# Patient Record
Sex: MALE | Race: WHITE | NOT HISPANIC OR LATINO | Employment: FULL TIME | URBAN - METROPOLITAN AREA
[De-identification: names, ages, dates, MRNs, and addresses within clinical notes are randomized per-mention and may not be internally consistent; named-entity substitution may affect disease eponyms.]

---

## 2017-01-09 ENCOUNTER — HOSPITAL ENCOUNTER (OUTPATIENT)
Dept: LAB | Facility: MEDICAL CENTER | Age: 62
End: 2017-01-09
Attending: INTERNAL MEDICINE
Payer: COMMERCIAL

## 2017-01-09 DIAGNOSIS — E03.9 HYPOTHYROIDISM: ICD-10-CM

## 2017-01-09 DIAGNOSIS — E78.5 HYPERLIPIDEMIA: ICD-10-CM

## 2017-01-09 LAB
CHOLEST SERPL-MCNC: 142 MG/DL (ref 100–199)
HDLC SERPL-MCNC: 50 MG/DL
LDLC SERPL CALC-MCNC: 81 MG/DL
TRIGL SERPL-MCNC: 57 MG/DL (ref 0–149)
TSH SERPL DL<=0.005 MIU/L-ACNC: 3.04 UIU/ML (ref 0.3–3.7)

## 2017-01-09 PROCEDURE — 80061 LIPID PANEL: CPT

## 2017-01-09 PROCEDURE — 84443 ASSAY THYROID STIM HORMONE: CPT

## 2017-01-09 PROCEDURE — 36415 COLL VENOUS BLD VENIPUNCTURE: CPT

## 2017-01-13 ENCOUNTER — OFFICE VISIT (OUTPATIENT)
Dept: MEDICAL GROUP | Facility: MEDICAL CENTER | Age: 62
End: 2017-01-13
Payer: COMMERCIAL

## 2017-01-13 VITALS
HEIGHT: 74 IN | DIASTOLIC BLOOD PRESSURE: 78 MMHG | BODY MASS INDEX: 25.54 KG/M2 | SYSTOLIC BLOOD PRESSURE: 120 MMHG | WEIGHT: 199 LBS | TEMPERATURE: 98.1 F | RESPIRATION RATE: 16 BRPM | OXYGEN SATURATION: 97 % | HEART RATE: 60 BPM

## 2017-01-13 DIAGNOSIS — K59.09 CHRONIC CONSTIPATION: ICD-10-CM

## 2017-01-13 DIAGNOSIS — Z00.00 ROUTINE GENERAL MEDICAL EXAMINATION AT A HEALTH CARE FACILITY: ICD-10-CM

## 2017-01-13 DIAGNOSIS — E03.9 HYPOTHYROIDISM, UNSPECIFIED TYPE: ICD-10-CM

## 2017-01-13 DIAGNOSIS — Z23 FLU VACCINE NEED: ICD-10-CM

## 2017-01-13 DIAGNOSIS — Z23 NEED FOR TDAP VACCINATION: ICD-10-CM

## 2017-01-13 DIAGNOSIS — E78.5 HYPERLIPIDEMIA, UNSPECIFIED HYPERLIPIDEMIA TYPE: ICD-10-CM

## 2017-01-13 PROCEDURE — 99396 PREV VISIT EST AGE 40-64: CPT | Mod: 25 | Performed by: INTERNAL MEDICINE

## 2017-01-13 PROCEDURE — 90472 IMMUNIZATION ADMIN EACH ADD: CPT | Performed by: INTERNAL MEDICINE

## 2017-01-13 PROCEDURE — 90715 TDAP VACCINE 7 YRS/> IM: CPT | Performed by: INTERNAL MEDICINE

## 2017-01-13 PROCEDURE — 90686 IIV4 VACC NO PRSV 0.5 ML IM: CPT | Performed by: INTERNAL MEDICINE

## 2017-01-13 PROCEDURE — 90471 IMMUNIZATION ADMIN: CPT | Performed by: INTERNAL MEDICINE

## 2017-01-13 RX ORDER — TRIAMCINOLONE ACETONIDE 1 MG/G
CREAM TOPICAL 2 TIMES DAILY
COMMUNITY
End: 2018-04-27

## 2017-01-13 NOTE — MR AVS SNAPSHOT
"        Steve Burnham   2017 2:30 PM   Office Visit   MRN: [id number]    Department:  [address] [place] [address]   Dept Phone:  [phone]    Description:  Male : 1955   Provider:  Allan Jones M.D.           Reason for Visit     Annual Exam           Allergies as of 2017     No Known Allergies      You were diagnosed with     Hypothyroidism, unspecified type   [0886376]       Hyperlipidemia, unspecified hyperlipidemia type   [3883221]       Flu vaccine need   [524216]       Need for Tdap vaccination   [239354]         Vital Signs     Blood Pressure Pulse Temperature Respirations Height Weight    120/78 mmHg 60 36.7 °C (98.1 °F) 16 1.88 m (6' 2.02\") 90.266 kg (199 lb)    Body Mass Index Oxygen Saturation Smoking Status             25.54 kg/m2 97% Never Smoker          Basic Information     Date Of Birth Sex Race Ethnicity Preferred Language    1955 Male White, Unknown Unknown English      Problem List              ICD-10-CM Priority Class Noted - Resolved    Hand arthritis M12.9   2015 - Present    Hypothyroidism E03.9   2015 - Present    Routine general medical examination at a health care facility Z00.00   2016 - Present    Flu vaccine need Z23   2016 - Present    Hyperlipidemia E78.5   2016 - Present    Chronic constipation K59.09   2016 - Present    Need for shingles vaccine Z23   2016 - Present      Health Maintenance        Date Due Completion Dates    IMM DTaP/Tdap/Td Vaccine (1 - Tdap) 1975 ---    IMM INFLUENZA (1) 2016    COLONOSCOPY 10/25/2019 10/25/2016, 2013            Current Immunizations     Influenza Vaccine Quad Inj (Pf)  Incomplete    Influenza Vaccine Quad Inj (Preserved) 2016    SHINGLES VACCINE 2016    Tdap Vaccine  Incomplete      Below and/or attached are the medications your provider expects you to take. Review all of your home medications and newly ordered medications with your provider and/or pharmacist. " Follow medication instructions as directed by your provider and/or pharmacist. Please keep your medication list with you and share with your provider. Update the information when medications are discontinued, doses are changed, or new medications (including over-the-counter products) are added; and carry medication information at all times in the event of emergency situations     Allergies:  No Known Allergies          Medications  Valid as of: January 13, 2017 -  3:23 PM    Generic Name Brand Name Tablet Size Instructions for use    Levothyroxine Sodium (Tab) SYNTHROID 25 MCG Take 1 Tab by mouth Every morning on an empty stomach.        Polyethylene Glycol 3350 (Powder) MIRALAX  Take 17 g by mouth every day.        Triamcinolone Acetonide (Cream) KENALOG 0.1 % Apply  to affected area(s) 2 times a day.        .                 Medicines prescribed today were sent to:     [id number] - [place], [place] - [address]    [address] [facility] [address]    Phone: [phone] Fax: [phone]    Open 24 Hours?: No      Medication refill instructions:       If your prescription bottle indicates you have medication refills left, it is not necessary to call your provider’s office. Please contact your pharmacy and they will refill your medication.    If your prescription bottle indicates you do not have any refills left, you may request refills at any time through one of the following ways: The online [employer] system (except Urgent Care), by calling your provider’s office, or by asking your pharmacy to contact your provider’s office with a refill request. Medication refills are processed only during regular business hours and may not be available until the next business day. Your provider may request additional information or to have a follow-up visit with you prior to refilling your medication.   *Please Note: Medication refills are assigned a new Rx number when refilled electronically. Your pharmacy may indicate that  no refills were authorized even though a new prescription for the same medication is available at the pharmacy. Please request the medicine by name with the pharmacy before contacting your provider for a refill.        Your To Do List     Future Labs/Procedures Complete By Expires    LIPID PROFILE  As directed 1/14/2018    TSH  As directed 1/14/2018         [employer] Access Code: Activation code not generated  Current [employer] Status: Active

## 2017-01-14 NOTE — PROGRESS NOTES
Chief Complaint:     Steve Burnhma is a 61 y.o. male who presents for a general exam and follow-up of his various health problems.    Need for Tdap vaccination  He has not had TDAP In the last 10 years and we'll get that done today.    Hypothyroidism  He continues levothyroxine. Clinically he is euthyroid. TSH is normal at 3.04.    Hyperlipidemia  He tries to follow appropriate diet. Cholesterol is 142, triglycerides 57, HDL 50, LDL 81.    Flu vaccine need  He has not had the flu vaccine this year and will get that done today.    Chronic constipation  His chronic constipation is not significantly changed. Last colonoscopy was October 2016. He uses MiraLAX with good result.      Last colonoscopy: 10/25/16  Last Td:  1/13/17  Hx STDs: no  Regular exercise: yes     He  has a past medical history of Hand arthritis (8/31/2015); Routine general medical examination at a health care facility (1/29/2016); and Chronic constipation (1/29/2016).  He  has no past surgical history on file.  Family History   Problem Relation Age of Onset   • Hypertension Father    • Thyroid Sister    • Hypertension Brother      Social History   Substance Use Topics   • Smoking status: Never Smoker    • Smokeless tobacco: Never Used   • Alcohol Use: 3.0 oz/week     5 Cans of beer per week       Current Outpatient Prescriptions   Medication Sig Dispense Refill   • triamcinolone acetonide (KENALOG) 0.1 % Cream Apply  to affected area(s) 2 times a day.     • levothyroxine (SYNTHROID) 25 MCG Tab Take 1 Tab by mouth Every morning on an empty stomach. 90 Tab 3   • polyethylene glycol 3350 (MIRALAX) Powder Take 17 g by mouth every day.       No current facility-administered medications for this visit.    (including changes today)  Allergies: Review of patient's allergies indicates no known allergies.    ROS:   General:  no recent change in strength, weight, appetite, or exercise tolerance.  CNS: no significant lightheadedness, headaches, fainting spells,  "seizures, or change in mentation.  EENT: no significant change in vision, hearing, sense of smell, swallowing, or voice.  RESP: no significant wheezing, coughing, or dyspnea.  CV: no significant palpitations or chest pain.  G.I.: no significant indigestion, abdominal pain, or change in bowel habits. Chronic constipation is unchanged.  : no significant change in urinating, no dysuria or hematuria, or change in sexual function, or change in testes.  EXTREM:  no significant edema, swelling, pain, or limitations of motion.  INTEG: no significant change in skin, hair or fingernails.  LYMPH: no significant adenopathy or other masses.  PSYCH: no significant anxiety or depression.        PHYSICAL EXAMINATION:  Body mass index is 25.54 kg/(m^2).  Wt Readings from Last 4 Encounters:   01/13/17 90.266 kg (199 lb)   09/09/16 92.625 kg (204 lb 3.2 oz)   01/29/16 93.895 kg (207 lb)   08/31/15 90.719 kg (200 lb)     PE:  /78 mmHg  Pulse 60  Temp(Src) 36.7 °C (98.1 °F)  Resp 16  Ht 1.88 m (6' 2.02\")  Wt 90.266 kg (199 lb)  BMI 25.54 kg/m2  SpO2 97%   GEN: clean, well groomed, in no acute distress, alert.  EENT: PERRL, normal EOMs, fundi unremarkable, normal external auditory canals and tympanic membranes, pharynx unremarkable, dentition satisfactory, nose unremarkable, neck supple and without significant lymphadenopathy or masses, trachea midline, thyroid unremarkable.  LUNGS: bilateral breast sounds, without significant wheezes or rales or abnormalities in percussion.  CV:  peripheral circulation is satisfactory, pedal pulses are satisfactory, heart sounds are unremarkable.  ABD: soft, without significant masses, no hepatosplenomegaly, no tenderness, bowel sounds are normal, no significant inguinal adenopathy.  : normal external genitalia, without urethral discharge, testes without significant masses, rectal exam unremarkable, prostate of normal contour and consistency,   EXTREM:  without significant edema, " cyanosis or deformity.  LYMPH:  no significant lymphadenopathy or lymphedema.  INTEG:  skin, hair, fingernails are unremarkable without significant rashes or lesions  NEURO:  essentially normal sensation, strength, gate, and cerebellar function, normal DTRs, Babinski is negative, affect is normal, oriented times three.  PSYCH: appropriate affect and mood.        ASSESSMENT/PLAN:  1. Hypothyroidism, unspecified type  TSH    Continue levothyroxine, check TSH at least yearly.   2. Hyperlipidemia, unspecified hyperlipidemia type  LIPID PROFILE    We again reviewed appropriate diet. Lipid panel is satisfactory.   3. Flu vaccine need  Flu Quad Inj >3 Year Pre-Filled PF    He will receive flu vaccine today.   4. Need for Tdap vaccination  Tdap =>6yo IM    He will receive TDAP Today.   5. Chronic constipation      Continue current regimen and follow-up with Dr. Lewis.   6. Routine general medical examination at a health care facility       Labs per orders  Counseling about diet, exercise, skin care  Vaccinations per orders  Next office visit for recheck of chronic medical conditions is due in  1 year

## 2017-01-14 NOTE — ASSESSMENT & PLAN NOTE
His chronic constipation is not significantly changed. Last colonoscopy was October 2016. He uses MiraLAX with good result.

## 2017-03-06 DIAGNOSIS — Z79.899 MEDICATION MANAGEMENT: ICD-10-CM

## 2017-03-06 RX ORDER — LEVOTHYROXINE SODIUM 0.03 MG/1
25 TABLET ORAL
Qty: 90 TAB | Refills: 3 | Status: SHIPPED | OUTPATIENT
Start: 2017-03-06 | End: 2018-03-02 | Stop reason: SDUPTHER

## 2017-03-07 DIAGNOSIS — Z79.899 MEDICATION MANAGEMENT: ICD-10-CM

## 2017-08-07 ENCOUNTER — TELEPHONE (OUTPATIENT)
Dept: MEDICAL GROUP | Facility: MEDICAL CENTER | Age: 62
End: 2017-08-07

## 2017-08-07 DIAGNOSIS — M54.42 ACUTE MIDLINE LOW BACK PAIN WITH LEFT-SIDED SCIATICA: ICD-10-CM

## 2017-08-07 NOTE — TELEPHONE ENCOUNTER
Patient needs new referral to [facility] for physical therapy, can you place the referral for patient

## 2017-09-19 RX ORDER — TRAMADOL HYDROCHLORIDE 50 MG/1
50 TABLET ORAL NIGHTLY PRN
Qty: 20 TAB | Refills: 0 | Status: SHIPPED | OUTPATIENT
Start: 2017-09-19 | End: 2018-04-27

## 2018-01-08 ENCOUNTER — HOSPITAL ENCOUNTER (OUTPATIENT)
Dept: LAB | Facility: MEDICAL CENTER | Age: 63
End: 2018-01-08
Attending: INTERNAL MEDICINE
Payer: COMMERCIAL

## 2018-01-08 DIAGNOSIS — E78.5 HYPERLIPIDEMIA, UNSPECIFIED HYPERLIPIDEMIA TYPE: ICD-10-CM

## 2018-01-08 DIAGNOSIS — E03.9 HYPOTHYROIDISM, UNSPECIFIED TYPE: ICD-10-CM

## 2018-01-08 LAB
CHOLEST SERPL-MCNC: 153 MG/DL (ref 100–199)
HDLC SERPL-MCNC: 56 MG/DL
LDLC SERPL CALC-MCNC: 87 MG/DL
TRIGL SERPL-MCNC: 52 MG/DL (ref 0–149)
TSH SERPL DL<=0.005 MIU/L-ACNC: 4.11 UIU/ML (ref 0.38–5.33)

## 2018-01-08 PROCEDURE — 80061 LIPID PANEL: CPT

## 2018-01-08 PROCEDURE — 36415 COLL VENOUS BLD VENIPUNCTURE: CPT

## 2018-01-08 PROCEDURE — 84443 ASSAY THYROID STIM HORMONE: CPT

## 2018-01-17 ENCOUNTER — OFFICE VISIT (OUTPATIENT)
Dept: MEDICAL GROUP | Facility: MEDICAL CENTER | Age: 63
End: 2018-01-17
Payer: COMMERCIAL

## 2018-01-17 VITALS
HEART RATE: 73 BPM | BODY MASS INDEX: 26.31 KG/M2 | OXYGEN SATURATION: 96 % | SYSTOLIC BLOOD PRESSURE: 128 MMHG | RESPIRATION RATE: 16 BRPM | WEIGHT: 205 LBS | TEMPERATURE: 98.4 F | DIASTOLIC BLOOD PRESSURE: 72 MMHG | HEIGHT: 74 IN

## 2018-01-17 DIAGNOSIS — Z00.00 ROUTINE GENERAL MEDICAL EXAMINATION AT A HEALTH CARE FACILITY: ICD-10-CM

## 2018-01-17 DIAGNOSIS — R10.32 LEFT INGUINAL PAIN: ICD-10-CM

## 2018-01-17 DIAGNOSIS — E03.9 HYPOTHYROIDISM, UNSPECIFIED TYPE: ICD-10-CM

## 2018-01-17 DIAGNOSIS — E78.5 HYPERLIPIDEMIA, UNSPECIFIED HYPERLIPIDEMIA TYPE: ICD-10-CM

## 2018-01-17 DIAGNOSIS — K59.09 CHRONIC CONSTIPATION: ICD-10-CM

## 2018-01-17 PROCEDURE — 99396 PREV VISIT EST AGE 40-64: CPT | Performed by: INTERNAL MEDICINE

## 2018-01-17 ASSESSMENT — PATIENT HEALTH QUESTIONNAIRE - PHQ9: CLINICAL INTERPRETATION OF PHQ2 SCORE: 0

## 2018-01-18 NOTE — PROGRESS NOTES
Chief Complaint:     Steve Burnham is a 62 y.o. male who presents for a periodic health evaluation.    Hypothyroidism  Clinically he is euthyroid. He continues taking levothyroxine without difficulty. His TSH is normal at 4.11.    Hyperlipidemia  He tries to follow appropriate diet with fairly good success. Most recent cholesterol is 153, triglycerides 52, HDL 56, LDL 87. We again briefly reviewed appropriate diet.    Chronic constipation  Chronic constipation and remains stable and unchanged. He uses MiraLAX periodically.    Left inguinal pain  He reports that for at least the last 3 months he has noticed intermittent burning or pulling sensation in the left inguinal region especially when he stands up. He reports that he did have hernia repair but doesn't remember which side it was on. He has not noticed any bulge in the area. He denies actual pain.      Last colonoscopy: 10/25/16  Last Td:  1/13/17  Hx STDs: no  Regular exercise: yes     He  has a past medical history of Chronic constipation (1/29/2016); Hand arthritis (8/31/2015); Left inguinal pain (1/17/2018); and Routine general medical examination at a health care facility (1/29/2016).  He  has no past surgical history on file.  Family History   Problem Relation Age of Onset   • Hypertension Father    • Thyroid Sister    • Hypertension Brother      Social History   Substance Use Topics   • Smoking status: Never Smoker   • Smokeless tobacco: Never Used   • Alcohol use 3.0 oz/week     5 Cans of beer per week       Current Outpatient Prescriptions   Medication Sig Dispense Refill   • tramadol (ULTRAM) 50 MG Tab Take 1 Tab by mouth at bedtime as needed. 20 Tab 0   • levothyroxine (SYNTHROID) 25 MCG Tab Take 1 Tab by mouth Every morning on an empty stomach. 90 Tab 3   • triamcinolone acetonide (KENALOG) 0.1 % Cream Apply  to affected area(s) 2 times a day.     • polyethylene glycol 3350 (MIRALAX) Powder Take 17 g by mouth every day.       No current  "facility-administered medications for this visit.     (including changes today)  Allergies: Patient has no known allergies.    ROS:   General:  no recent change in strength, weight, appetite, or exercise tolerance.  CNS: no significant lightheadedness, headaches, fainting spells, seizures, or change in mentation.  EENT: no significant change in vision, hearing, sense of smell, swallowing, or voice.  RESP: no significant wheezing, coughing, or dyspnea.  CV: no significant palpitations or chest pain.  G.I.: no significant indigestion, abdominal pain, or change in bowel habits.  : no significant change in urinating, no dysuria or hematuria, or change in sexual function, or change in testes. He has above noted left inguinal burning discomfort.  EXTREM:  no significant edema, swelling, pain, or limitations of motion.  INTEG: no significant change in skin, hair or fingernails.  LYMPH: no significant adenopathy or other masses.  PSYCH: no significant anxiety or depression.        PHYSICAL EXAMINATION:  Body mass index is 26.32 kg/m².  Wt Readings from Last 4 Encounters:   01/17/18 93 kg (205 lb)   01/13/17 90.3 kg (199 lb)   09/09/16 92.6 kg (204 lb 3.2 oz)   01/29/16 93.9 kg (207 lb)     PE:  /72   Pulse 73   Temp 36.9 °C (98.4 °F)   Resp 16   Ht 1.88 m (6' 2\")   Wt 93 kg (205 lb)   SpO2 96%   BMI 26.32 kg/m²    GEN: clean, well groomed, in no acute distress, alert.  EENT: PERRL, normal EOMs, fundi unremarkable, normal external auditory canals and tympanic membranes, pharynx unremarkable, dentition satisfactory, nose unremarkable, neck supple and without significant lymphadenopathy or masses, trachea midline, thyroid unremarkable.  LUNGS: bilateral breath sounds, without significant wheezes or rales or abnormalities in percussion.  CV:  peripheral circulation is satisfactory, pedal pulses are satisfactory, heart sounds are unremarkable.  ABD: soft, without significant masses, no hepatosplenomegaly, no " tenderness, bowel sounds are normal, no significant inguinal adenopathy.  : normal external genitalia, without urethral discharge, testes without significant masses, rectal exam unremarkable, prostate of normal contour and consistency, I do not feel any inguinal bulge and there is no significant tenderness to palpation of the inguinal ring.  EXTREM:  without significant edema, cyanosis or deformity.  LYMPH:  no significant lymphadenopathy or lymphedema.  INTEG:  skin, hair, fingernails are unremarkable without significant rashes or lesions  NEURO:  essentially normal sensation, strength, gate, and cerebellar function, normal DTRs, affect is normal, oriented times three.  PSYCH: appropriate affect and mood.        ASSESSMENT/PLAN:  1. Hypothyroidism, unspecified type  TSH    Continue levothyroxine, check TSH at least yearly.   2. Hyperlipidemia, unspecified hyperlipidemia type  LIPID PROFILE    We again reviewed appropriate diet. No new medication at this time.   3. Routine general medical examination at a health care facility     4. Chronic constipation      Continue same regimen. Report any significant changes.   5. Left inguinal pain      Possible adhesions, doubt recurrent hernia at this time.     Labs per orders  Counseling about diet, exercise, skin care  Vaccinations per orders  Next office visit for recheck of chronic medical conditions is due in  1 year

## 2018-01-18 NOTE — ASSESSMENT & PLAN NOTE
He reports that for at least the last 3 months he has noticed intermittent burning or pulling sensation in the left inguinal region especially when he stands up. He reports that he did have hernia repair but doesn't remember which side it was on. He has not noticed any bulge in the area. He denies actual pain.

## 2018-01-18 NOTE — ASSESSMENT & PLAN NOTE
Clinically he is euthyroid. He continues taking levothyroxine without difficulty. His TSH is normal at 4.11.

## 2018-01-18 NOTE — ASSESSMENT & PLAN NOTE
He tries to follow appropriate diet with fairly good success. Most recent cholesterol is 153, triglycerides 52, HDL 56, LDL 87. We again briefly reviewed appropriate diet.

## 2018-02-26 ENCOUNTER — OFFICE VISIT (OUTPATIENT)
Dept: MEDICAL GROUP | Facility: MEDICAL CENTER | Age: 63
End: 2018-02-26
Payer: COMMERCIAL

## 2018-02-26 VITALS
HEIGHT: 74 IN | TEMPERATURE: 97.8 F | RESPIRATION RATE: 16 BRPM | WEIGHT: 207 LBS | SYSTOLIC BLOOD PRESSURE: 130 MMHG | HEART RATE: 74 BPM | OXYGEN SATURATION: 97 % | DIASTOLIC BLOOD PRESSURE: 82 MMHG | BODY MASS INDEX: 26.56 KG/M2

## 2018-02-26 DIAGNOSIS — K40.90 INGUINAL HERNIA OF LEFT SIDE WITHOUT OBSTRUCTION OR GANGRENE: ICD-10-CM

## 2018-02-26 PROCEDURE — 99213 OFFICE O/P EST LOW 20 MIN: CPT | Performed by: INTERNAL MEDICINE

## 2018-02-27 NOTE — ASSESSMENT & PLAN NOTE
This patient reports that for the last month or more he has had some mild discomfort in the left inguinal region. It has slowly gotten worse with a burning sensation and he has noticed more of a bulge there. This is similar to a prior right inguinal hernia that he had previously. He denies any indigestion or other abdominal pain or change in bowel habits or difficulty passing his urine. He otherwise feels fairly well.

## 2018-02-27 NOTE — PROGRESS NOTES
Subjective:     Chief Complaint   Patient presents with   • Hernia     Steve Burnham is a 62 y.o. male here today for evaluation of discomfort and a bulge in the left inguinal area.    Inguinal hernia of left side without obstruction or gangrene  This patient reports that for the last month or more he has had some mild discomfort in the left inguinal region. It has slowly gotten worse with a burning sensation and he has noticed more of a bulge there. This is similar to a prior right inguinal hernia that he had previously. He denies any indigestion or other abdominal pain or change in bowel habits or difficulty passing his urine. He otherwise feels fairly well.       The encounter diagnosis was Inguinal hernia of left side without obstruction or gangrene.    Allergies: Patient has no known allergies.  Current medicines (including changes today)  Current Outpatient Prescriptions   Medication Sig Dispense Refill   • levothyroxine (SYNTHROID) 25 MCG Tab Take 1 Tab by mouth Every morning on an empty stomach. 90 Tab 3   • polyethylene glycol 3350 (MIRALAX) Powder Take 17 g by mouth every day.     • tramadol (ULTRAM) 50 MG Tab Take 1 Tab by mouth at bedtime as needed. 20 Tab 0   • triamcinolone acetonide (KENALOG) 0.1 % Cream Apply  to affected area(s) 2 times a day.       No current facility-administered medications for this visit.        He  has a past medical history of Chronic constipation (1/29/2016); Hand arthritis (8/31/2015); Left inguinal pain (1/17/2018); and Routine general medical examination at a health care facility (1/29/2016).    ROS    Patient denies significant change in strength, weight or appetite.  No significant lightheadedness or headaches.  No change in vision, hearing, or swallowing.  No new dyspnea, coughing, chest pain, or palpitations.  No indigestion, abdominal pain, or change in bowel habits. Left inguinal bulge and discomfort as noted. This is more prominent as the day goes on and disappears  "at night.  No change in urinating.  No new ankle swelling.       Objective:     PE:  /82   Pulse 74   Temp 36.6 °C (97.8 °F)   Resp 16   Ht 1.88 m (6' 2.02\")   Wt 93.9 kg (207 lb)   SpO2 97%   BMI 26.56 kg/m²    Neck is supple without significant lymphadenopathy or masses.  Lungs are clear with normal breath sounds without wheezes or rales .  Cardiovascular: peripheral circulation is satisfactory, heart sounds are unchanged and unremarkable.  Abdomen is soft, without masses or tenderness, with normal bowel sounds. There is a bulge in the left inguinal region that is mildly tender to palpation. This is most compatible with an inguinal hernia. There is no inguinal adenopathy.  Extremities are without significant edema, cyanosis or deformity.      Assessment and Plan:   The following treatment plan was discussed  1. Inguinal hernia of left side without obstruction or gangrene  REFERRAL TO GENERAL SURGERY    Refer to general surgery. Prior hernia was repaired by Dr. Hernandez       Followup: He will keep his next scheduled appointment or contact us as needed sooner.           "

## 2018-03-02 DIAGNOSIS — Z79.899 MEDICATION MANAGEMENT: ICD-10-CM

## 2018-03-02 RX ORDER — LEVOTHYROXINE SODIUM 0.03 MG/1
25 TABLET ORAL
Qty: 90 TAB | Refills: 3 | Status: SHIPPED | OUTPATIENT
Start: 2018-03-02 | End: 2019-02-22 | Stop reason: SDUPTHER

## 2018-03-19 ENCOUNTER — HOSPITAL ENCOUNTER (OUTPATIENT)
Facility: MEDICAL CENTER | Age: 63
End: 2018-03-19
Attending: SURGERY | Admitting: SURGERY
Payer: COMMERCIAL

## 2018-04-27 VITALS — BODY MASS INDEX: 25.58 KG/M2 | HEIGHT: 74 IN | WEIGHT: 199.3 LBS

## 2018-04-27 DIAGNOSIS — Z01.812 PRE-OPERATIVE LABORATORY EXAMINATION: ICD-10-CM

## 2018-04-27 DIAGNOSIS — Z01.810 PRE-OPERATIVE CARDIOVASCULAR EXAMINATION: ICD-10-CM

## 2018-04-27 LAB
ALBUMIN SERPL BCP-MCNC: 4.4 G/DL (ref 3.2–4.9)
ALBUMIN/GLOB SERPL: 1.7 G/DL
ALP SERPL-CCNC: 47 U/L (ref 30–99)
ALT SERPL-CCNC: 20 U/L (ref 2–50)
ANION GAP SERPL CALC-SCNC: 8 MMOL/L (ref 0–11.9)
AST SERPL-CCNC: 19 U/L (ref 12–45)
BASOPHILS # BLD AUTO: 0.4 % (ref 0–1.8)
BASOPHILS # BLD: 0.03 K/UL (ref 0–0.12)
BILIRUB SERPL-MCNC: 0.6 MG/DL (ref 0.1–1.5)
BUN SERPL-MCNC: 19 MG/DL (ref 8–22)
CALCIUM SERPL-MCNC: 9.2 MG/DL (ref 8.5–10.5)
CHLORIDE SERPL-SCNC: 103 MMOL/L (ref 96–112)
CO2 SERPL-SCNC: 27 MMOL/L (ref 20–33)
CREAT SERPL-MCNC: 0.88 MG/DL (ref 0.5–1.4)
EKG IMPRESSION: NORMAL
EOSINOPHIL # BLD AUTO: 0.07 K/UL (ref 0–0.51)
EOSINOPHIL NFR BLD: 1 % (ref 0–6.9)
ERYTHROCYTE [DISTWIDTH] IN BLOOD BY AUTOMATED COUNT: 44.2 FL (ref 35.9–50)
GLOBULIN SER CALC-MCNC: 2.6 G/DL (ref 1.9–3.5)
GLUCOSE SERPL-MCNC: 87 MG/DL (ref 65–99)
HCT VFR BLD AUTO: 44.5 % (ref 42–52)
HGB BLD-MCNC: 15.1 G/DL (ref 14–18)
IMM GRANULOCYTES # BLD AUTO: 0.01 K/UL (ref 0–0.11)
IMM GRANULOCYTES NFR BLD AUTO: 0.1 % (ref 0–0.9)
LYMPHOCYTES # BLD AUTO: 1.92 K/UL (ref 1–4.8)
LYMPHOCYTES NFR BLD: 27.9 % (ref 22–41)
MCH RBC QN AUTO: 31.3 PG (ref 27–33)
MCHC RBC AUTO-ENTMCNC: 33.9 G/DL (ref 33.7–35.3)
MCV RBC AUTO: 92.1 FL (ref 81.4–97.8)
MONOCYTES # BLD AUTO: 0.61 K/UL (ref 0–0.85)
MONOCYTES NFR BLD AUTO: 8.9 % (ref 0–13.4)
NEUTROPHILS # BLD AUTO: 4.24 K/UL (ref 1.82–7.42)
NEUTROPHILS NFR BLD: 61.7 % (ref 44–72)
NRBC # BLD AUTO: 0 K/UL
NRBC BLD-RTO: 0 /100 WBC
PLATELET # BLD AUTO: 202 K/UL (ref 164–446)
PMV BLD AUTO: 10 FL (ref 9–12.9)
POTASSIUM SERPL-SCNC: 3.9 MMOL/L (ref 3.6–5.5)
PROT SERPL-MCNC: 7 G/DL (ref 6–8.2)
RBC # BLD AUTO: 4.83 M/UL (ref 4.7–6.1)
SODIUM SERPL-SCNC: 138 MMOL/L (ref 135–145)
WBC # BLD AUTO: 6.9 K/UL (ref 4.8–10.8)

## 2018-04-27 PROCEDURE — 85025 COMPLETE CBC W/AUTO DIFF WBC: CPT

## 2018-04-27 PROCEDURE — 36415 COLL VENOUS BLD VENIPUNCTURE: CPT

## 2018-04-27 PROCEDURE — 93005 ELECTROCARDIOGRAM TRACING: CPT

## 2018-04-27 PROCEDURE — 80053 COMPREHEN METABOLIC PANEL: CPT

## 2018-04-27 PROCEDURE — 93010 ELECTROCARDIOGRAM REPORT: CPT | Performed by: INTERNAL MEDICINE

## 2018-04-27 NOTE — OR NURSING
Pt is requesting to speak to anesthesia for procedure scheduled 5.8.18; message/phone number given to Lizzeth/[facility] at at this time; pt can be reached M,W all day or Fri after 1300, [phone].

## 2018-04-27 NOTE — OR NURSING
Spoke to Denise/Dr Mejia' office regarding pre op orders for procedure scheduled 5.8.18; pt in for pre admit interview/labs/diagnostics 4.27.18; cxr down 4.27.18 in the pre admit dept; per rosa maria Walker to do cxr on admit 5.8.18.

## 2018-05-30 ENCOUNTER — APPOINTMENT (OUTPATIENT)
Dept: ADMISSIONS | Facility: MEDICAL CENTER | Age: 63
End: 2018-05-30
Attending: SURGERY
Payer: COMMERCIAL

## 2018-06-13 ENCOUNTER — HOSPITAL ENCOUNTER (OUTPATIENT)
Facility: MEDICAL CENTER | Age: 63
End: 2018-06-13
Attending: SURGERY | Admitting: SURGERY
Payer: COMMERCIAL

## 2018-06-13 VITALS
DIASTOLIC BLOOD PRESSURE: 55 MMHG | BODY MASS INDEX: 25.35 KG/M2 | WEIGHT: 197.53 LBS | TEMPERATURE: 97.5 F | HEIGHT: 74 IN | SYSTOLIC BLOOD PRESSURE: 93 MMHG | HEART RATE: 64 BPM | RESPIRATION RATE: 14 BRPM | OXYGEN SATURATION: 97 %

## 2018-06-13 DIAGNOSIS — G89.18 ACUTE POSTOPERATIVE PAIN: ICD-10-CM

## 2018-06-13 DIAGNOSIS — K40.90 LEFT INGUINAL HERNIA: ICD-10-CM

## 2018-06-13 PROCEDURE — 160028 HCHG SURGERY MINUTES - 1ST 30 MINS LEVEL 3: Performed by: SURGERY

## 2018-06-13 PROCEDURE — 160035 HCHG PACU - 1ST 60 MINS PHASE I: Performed by: SURGERY

## 2018-06-13 PROCEDURE — 160046 HCHG PACU - 1ST 60 MINS PHASE II: Performed by: SURGERY

## 2018-06-13 PROCEDURE — 160025 RECOVERY II MINUTES (STATS): Performed by: SURGERY

## 2018-06-13 PROCEDURE — 700111 HCHG RX REV CODE 636 W/ 250 OVERRIDE (IP)

## 2018-06-13 PROCEDURE — 500002 HCHG ADHESIVE, DERMABOND: Performed by: SURGERY

## 2018-06-13 PROCEDURE — 160009 HCHG ANES TIME/MIN: Performed by: SURGERY

## 2018-06-13 PROCEDURE — 700101 HCHG RX REV CODE 250: Performed by: SURGERY

## 2018-06-13 PROCEDURE — 160048 HCHG OR STATISTICAL LEVEL 1-5: Performed by: SURGERY

## 2018-06-13 PROCEDURE — C1781 MESH (IMPLANTABLE): HCPCS | Performed by: SURGERY

## 2018-06-13 PROCEDURE — 700101 HCHG RX REV CODE 250

## 2018-06-13 PROCEDURE — 501838 HCHG SUTURE GENERAL: Performed by: SURGERY

## 2018-06-13 PROCEDURE — 160002 HCHG RECOVERY MINUTES (STAT): Performed by: SURGERY

## 2018-06-13 PROCEDURE — 500380 HCHG DRAIN, PENROSE 1/4X12: Performed by: SURGERY

## 2018-06-13 PROCEDURE — 160039 HCHG SURGERY MINUTES - EA ADDL 1 MIN LEVEL 3: Performed by: SURGERY

## 2018-06-13 DEVICE — MESH FLAT SHEET 3 X 6 - (3EA/CA): Type: IMPLANTABLE DEVICE | Site: GROIN | Status: FUNCTIONAL

## 2018-06-13 RX ORDER — SODIUM CHLORIDE, SODIUM LACTATE, POTASSIUM CHLORIDE, CALCIUM CHLORIDE 600; 310; 30; 20 MG/100ML; MG/100ML; MG/100ML; MG/100ML
INJECTION, SOLUTION INTRAVENOUS CONTINUOUS
Status: DISCONTINUED | OUTPATIENT
Start: 2018-06-13 | End: 2018-06-13 | Stop reason: HOSPADM

## 2018-06-13 RX ORDER — HYDROCODONE BITARTRATE AND ACETAMINOPHEN 5; 325 MG/1; MG/1
1-2 TABLET ORAL EVERY 4 HOURS PRN
Qty: 40 TAB | Refills: 0 | Status: SHIPPED | OUTPATIENT
Start: 2018-06-13 | End: 2018-06-20

## 2018-06-13 RX ORDER — LIDOCAINE HYDROCHLORIDE 10 MG/ML
0.5 INJECTION, SOLUTION INFILTRATION; PERINEURAL
Status: DISCONTINUED | OUTPATIENT
Start: 2018-06-13 | End: 2018-06-13 | Stop reason: HOSPADM

## 2018-06-13 RX ORDER — BUPIVACAINE HYDROCHLORIDE AND EPINEPHRINE 5; 5 MG/ML; UG/ML
INJECTION, SOLUTION EPIDURAL; INTRACAUDAL; PERINEURAL
Status: DISCONTINUED | OUTPATIENT
Start: 2018-06-13 | End: 2018-06-13 | Stop reason: HOSPADM

## 2018-06-13 RX ORDER — LIDOCAINE HYDROCHLORIDE 10 MG/ML
INJECTION, SOLUTION EPIDURAL; INFILTRATION; INTRACAUDAL; PERINEURAL
Status: COMPLETED
Start: 2018-06-13 | End: 2018-06-13

## 2018-06-13 RX ADMIN — LIDOCAINE HYDROCHLORIDE 0.5 ML: 10 INJECTION, SOLUTION EPIDURAL; INFILTRATION; INTRACAUDAL; PERINEURAL at 08:40

## 2018-06-13 RX ADMIN — LIDOCAINE HYDROCHLORIDE 0.5 ML: 10 INJECTION, SOLUTION INFILTRATION; PERINEURAL at 08:40

## 2018-06-13 RX ADMIN — SODIUM CHLORIDE, SODIUM LACTATE, POTASSIUM CHLORIDE, CALCIUM CHLORIDE: 600; 310; 30; 20 INJECTION, SOLUTION INTRAVENOUS at 08:40

## 2018-06-13 ASSESSMENT — PAIN SCALES - GENERAL
PAINLEVEL_OUTOF10: 0
PAINLEVEL_OUTOF10: 1
PAINLEVEL_OUTOF10: 1
PAINLEVEL_OUTOF10: 0

## 2018-06-13 NOTE — OR NURSING
Pre op admission completed.  Surgical plan of care discussed with pt.  Questions answered.  Call light within reach.

## 2018-06-13 NOTE — OP REPORT
DATE OF SERVICE:  06/13/2018    PREOPERATIVE DIAGNOSIS:  Symptomatic left inguinal hernia.    POSTOPERATIVE DIAGNOSIS:  Symptomatic indirect left inguinal hernia with cord   lipoma.    PROCEDURE PERFORMED:  Left inguinal hernia repair with mesh.    PRIMARY SURGEON:  Eb Goyal MD    ASSISTANT:  None.    ANESTHESIOLOGIST:  Ganga Marino MD    ANESTHESIA:  General laryngeal mask airway.    ESTIMATED BLOOD LOSS:  10 mL.    COMPLICATIONS:  None immediately apparent.    SPECIMENS:  None.    OPERATIVE FINDINGS:  The patient had a fairly large indirect hernia sac, which   was high ligated.  There was also an associated cord lipoma, which was   removed from the cord structures as well.  There is no evidence of direct or   femoral hernias.    DESCRIPTION OF PROCEDURE:  The patient was taken back to the operating room   and placed in the supine position.  General anesthesia was induced and a   laryngeal mask airway was placed.  Appropriate IV antibiotics were given.  All   bony prominences were carefully protected.  Bilateral SC devices were placed.    The lower abdomen was then widely prepped and draped in a sterile fashion.    A time-out was performed.  The patient and procedure were both verified.    A 7 cm oblique incision was made over the left inguinal crease.  Subcutaneous   tissues were divided down to the level of the external oblique aponeurosis.    This was then opened along its fibers through the external ring.  The hernia   and cord structures were carefully swept off the pubic tubercle and encircled   with a Penrose drain.  The direct space was then palpated and visualized and   did not appear to be any laxity or attenuation in this area.  There were no   hernias in this area.  I then placed my finger along the inferior edge of the   inguinal ligament and was not able to appreciate any significant femoral   hernia.  I then turned my attention to exploration of the cord.  I was able to   identify a  fairly large hernia sac coursing with the spermatic cord.  This   was carefully meticulously dissected free from the surrounding cord   structures.  There was quite a bit of adhesions and scar tissue of the sac to   adjacent structures.  We were able to dissect the sac all the way back to the   internal ring and a high ligation was performed with 3-0 Vicryl suture.  The   sac was discarded.  Further exploration of the cord revealed a fairly sizable   cord lipoma.  This was carefully meticulously dissected free from the   surrounding spermatic cord structures.  High ligation of this lipoma near the   internal ring was performed with 3-0 Vicryl as well.  The lipoma was   discarded.  A 3 inch x 6 inch Prolene mesh was then cut to fit the patient's   anatomy in the usual bullet fashion.  It was secured medially to the pubic   tubercle, superiorly along the conjoined tendon and inferiorly along the   shelving edge of the inguinal ligament using interrupted 0 Ethibond sutures.    The legs of the mesh were placed out laterally coursing towards the anterior   superior iliac spine.  The ring of the mesh around the cord structures were   then created with a single Ethibond suture as well.  Care was taken not to   impart too much constriction around the cord structures.  The patient's   external oblique aponeurosis was then approximated over the repair with a   running 3-0 Vicryl suture.  The deep tissues were closed with interrupted 3-0   Vicryl sutures.  The wound was approximated with interrupted 3-0 Vicryl deep   dermal sutures.  The skin was closed with running 4-0 Monocryl subcuticular   suture.  The abdomen was cleaned and Dermabond was applied.  I double checked   to make sure the patient's left testicle resides within the left hemiscrotum.    Overall, the patient tolerated this procedure well.  He was allowed to awaken   from his general anesthetic and the laryngeal mask airway was removed.  He was   taken to PACU in  stable condition.  All sharps and sponge counts were correct   by the end of the case on 2 occasions.    ASA SCORE:  2.    WOUND CLASSIFICATION:  Clean.       ____________________________________     MD LINDSEY Florian / PETER    DD:  06/13/2018 11:24:07  DT:  06/13/2018 11:39:51    D#:  [id number]  Job#:  [id number]

## 2018-06-13 NOTE — DISCHARGE INSTRUCTIONS
ACTIVITY: Rest and take it easy for the first 24 hours.  A responsible adult is recommended to remain with you during that time.  It is normal to feel sleepy.  We encourage you to not do anything that requires balance, judgment or coordination.    MILD FLU-LIKE SYMPTOMS ARE NORMAL. YOU MAY EXPERIENCE GENERALIZED MUSCLE ACHES, THROAT IRRITATION, HEADACHE AND/OR SOME NAUSEA.    FOR 24 HOURS DO NOT:  Drive, operate machinery or run household appliances.  Drink beer or alcoholic beverages.   Make important decisions or sign legal documents.    SPECIAL INSTRUCTIONS & SURGICAL DRESSING/BATHING:  Inguinal Hernia Repair Discharge Instructions:     1. DIET: After discharge from the hospital you may resume your normal preoperative diet without restrictions.     2. ACTIVITIES: After discharge from the hospital, you may resume full routine activities. Heavy lifting (over 15 pounds) and strenuous activities will make your incision sore and should be avoided for one month after surgery. Routine activities of daily living such as walking, going up and down stairs are acceptable.     3. DRIVING: You may drive whenever you are no longer taking narcotic pain medications and are able to perform the activities needed to drive safely, i.e. turning, bending, twisting, etc.     4. WOUND/BATHING: You may get the wound wet at any time after leaving the hospital. You may shower, but do not submerge in a bath or pool until seen for follow up. You may remove the skin glue using a finger or a pair of tweezers in one week.     5. BOWEL FUNCTION: The combination of pain medication and decreased activity level can cause constipation in otherwise normal patients. If you feel this is occurring, take a laxative (Milk of Magnesia, Ex-Lax, Senokot, Miralax, Magnesium Citrate) until the problem has resolved.     6. PAIN MEDICATION: You will be given a prescription for pain medication at discharge. Please take these as directed. It is advisable to take  your medication around the clock for the first 24 to 48 hours. You may continue any non-steroidal anti-inflammatory medications (NSAIDs) such as Advil in the post-operative period. These may and should be taken with your narcotic pain medication. It is important to remember not to take medications on an empty stomach as this may cause nausea. Do not consume alcohol while taking pain medication.     7. WHAT TO EXPECT: You may develop swelling and bruising at the base of your genitalia and within the scrotum (males) or labias (females).  This is due to bleeding from the operative site tracking down into these areas.  The bleeding typically stops within a few hours after surgery.  The bruising may take several weeks to resolve.       8. CALL IF YOU HAVE: (1) Fevers to more than 101F, (2) Unusual chest or leg pain, (3) Drainage or fluid from incision that may be foul smelling, increased tenderness or soreness at the wound or the wound edges are no longer together, redness or swelling at the incision site.  (4) Profound swelling at the incision site or in the genitals.     9. FOLLOW-UP: Call and schedule a follow up appointment in 2 weeks.     If you have any additional questions at all, please do not hesitate to call the office and speak to my medical assistant, myself, or the physician on call.     [address] [facility], [address]   [place] [address]     Manjinder Goyal MD   [facility]   [phone]    DIET: To avoid nausea, slowly advance diet as tolerated, avoiding spicy or greasy foods for the first day.  Add more substantial food to your diet according to your physician's instructions.  Babies can be fed formula or breast milk as soon as they are hungry.  INCREASE FLUIDS AND FIBER TO AVOID CONSTIPATION.    FOLLOW-UP APPOINTMENT:  A follow-up appointment should be arranged with your doctor in *2 weeks*; call to schedule.    You should CALL YOUR PHYSICIAN if you develop:  Fever greater than 101 degrees F.  Pain not  relieved by medication, or persistent nausea or vomiting.  Excessive bleeding (blood soaking through dressing) or unexpected drainage from the wound.  Extreme redness or swelling around the incision site, drainage of pus or foul smelling drainage.  Inability to urinate or empty your bladder within 8 hours.  Problems with breathing or chest pain.    You should call 911 if you develop problems with breathing or chest pain.  If you are unable to contact your doctor or surgical center, you should go to the nearest emergency room or urgent care center.  Physician's telephone #: *Dr. Goyal [phone]*    If any questions arise, call your doctor.  If your doctor is not available, please feel free to call the Surgical Center at [phone].  The Center is open Monday through Friday from 7AM to 7PM.  You can also call the MEEP HOTLINE open 24 hours/day, 7 days/week and speak to a nurse at [phone], or toll free at [phone].    A registered nurse may call you a few days after your surgery to see how you are doing after your procedure.    MEDICATIONS: Resume taking daily medication.  Take prescribed pain medication with food.  If no medication is prescribed, you may take non-aspirin pain medication if needed.  PAIN MEDICATION CAN BE VERY CONSTIPATING.  Take a stool softener or laxative such as senokot, pericolace, or milk of magnesia if needed.    Prescription given for *Norco*.  Pain medication may be taken anytime.    If your physician has prescribed pain medication that includes Acetaminophen (Tylenol), do not take additional Acetaminophen (Tylenol) while taking the prescribed medication.    Depression / Suicide Risk    As you are discharged from this [facility] facility, it is important to learn how to keep safe from harming yourself.    Recognize the warning signs:  · Abrupt changes in personality, positive or negative- including increase in energy   · Giving away possessions  · Change in eating  patterns- significant weight changes-  positive or negative  · Change in sleeping patterns- unable to sleep or sleeping all the time   · Unwillingness or inability to communicate  · Depression  · Unusual sadness, discouragement and loneliness  · Talk of wanting to die  · Neglect of personal appearance   · Rebelliousness- reckless behavior  · Withdrawal from people/activities they love  · Confusion- inability to concentrate     If you or a loved one observes any of these behaviors or has concerns about self-harm, here's what you can do:  · Talk about it- your feelings and reasons for harming yourself  · Remove any means that you might use to hurt yourself (examples: pills, rope, extension cords, firearm)  · Get professional help from the community (Mental Health, Substance Abuse, psychological counseling)  · Do not be alone:Call your Safe Contact- someone whom you trust who will be there for you.  · Call your local CRISIS HOTLINE 269-4419 or 792-819-4810  · Call your local Children's Mobile Crisis Response Team [place] (752) 595-1550 or www.[facility] [employer]  · Call the toll free National Suicide Prevention Hotlines   · National Suicide Prevention Lifeline 172-885-HYSA (5767)  · National Hope Line Network 800-SUICIDE (639-4534)

## 2018-06-13 NOTE — OR NURSING
VSS on room air. Reported pain 1/10, declined meds, ice pack in place. Incision with dermabond well approximated, no drainage. AxOx4. Wife Jaz updated on status.

## 2018-10-29 ENCOUNTER — OFFICE VISIT (OUTPATIENT)
Dept: MEDICAL GROUP | Facility: MEDICAL CENTER | Age: 63
End: 2018-10-29
Payer: COMMERCIAL

## 2018-10-29 VITALS
HEIGHT: 74 IN | HEART RATE: 76 BPM | WEIGHT: 199 LBS | SYSTOLIC BLOOD PRESSURE: 120 MMHG | OXYGEN SATURATION: 96 % | BODY MASS INDEX: 25.54 KG/M2 | DIASTOLIC BLOOD PRESSURE: 70 MMHG | TEMPERATURE: 97.3 F

## 2018-10-29 DIAGNOSIS — M25.512 CHRONIC LEFT SHOULDER PAIN: ICD-10-CM

## 2018-10-29 DIAGNOSIS — Z23 FLU VACCINE NEED: ICD-10-CM

## 2018-10-29 DIAGNOSIS — R35.1 NOCTURIA: ICD-10-CM

## 2018-10-29 DIAGNOSIS — G89.29 CHRONIC LEFT SHOULDER PAIN: ICD-10-CM

## 2018-10-29 PROCEDURE — 90686 IIV4 VACC NO PRSV 0.5 ML IM: CPT | Performed by: INTERNAL MEDICINE

## 2018-10-29 PROCEDURE — 99213 OFFICE O/P EST LOW 20 MIN: CPT | Mod: 25 | Performed by: INTERNAL MEDICINE

## 2018-10-29 PROCEDURE — 90471 IMMUNIZATION ADMIN: CPT | Performed by: INTERNAL MEDICINE

## 2018-10-29 RX ORDER — TAMSULOSIN HYDROCHLORIDE 0.4 MG/1
0.4 CAPSULE ORAL
Qty: 30 CAP | Refills: 11 | Status: SHIPPED | OUTPATIENT
Start: 2018-10-29 | End: 2019-01-21

## 2018-10-29 NOTE — ASSESSMENT & PLAN NOTE
He has problems with nocturia x3-4.  Actually he awakens for reasons uncertain and goes into the bathroom to urinate not necessarily feeling that his bladder is full.  He denies dysuria.  He does not want to try any sleeping medications.  He once was advised to see a urologist but that never transpired.

## 2018-10-29 NOTE — ASSESSMENT & PLAN NOTE
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  The discomfort is been present for about 2 months.  He takes occasional Aleve which seems to help.  He is not aware of any recent injury.  As noted, he sits for long periods of time with his arms in one position and his muscles tense.

## 2018-10-29 NOTE — PROGRESS NOTES
Subjective:     Chief Complaint   Patient presents with   • Shoulder Pain     x2 months, left arm      Steve Burnham is a 63 y.o. male here today for evaluation of left shoulder discomfort along with nocturia and he would like to get the flu vaccine.    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  The discomfort is been present for about 2 months.  He takes occasional Aleve which seems to help.  He is not aware of any recent injury.  As noted, he sits for long periods of time with his arms in one position and his muscles tense.    Flu vaccine need  He has not had the flu vaccine recently and would like to get that done today.    Nocturia  He has problems with nocturia x3-4.  Actually he awakens for reasons uncertain and goes into the bathroom to urinate not necessarily feeling that his bladder is full.  He denies dysuria.  He does not want to try any sleeping medications.  He once was advised to see a urologist but that never transpired.       Diagnoses of Nocturia, Chronic left shoulder pain, and Flu vaccine need were pertinent to this visit.    Allergies: Patient has no known allergies.  Current medicines (including changes today)  Current Outpatient Prescriptions   Medication Sig Dispense Refill   • tamsulosin (FLOMAX) 0.4 MG capsule Take 1 Cap by mouth ONE-HALF HOUR AFTER BREAKFAST. 30 Cap 11   • levothyroxine (SYNTHROID) 25 MCG Tab TAKE 1 TAB BY MOUTH EVERY MORNING ON AN EMPTY STOMACH. 90 Tab 3   • polyethylene glycol 3350 (MIRALAX) Powder Take 17 g by mouth every day.       No current facility-administered medications for this visit.        He  has a past medical history of Cataract; Chronic constipation (1/29/2016); Disorder of thyroid; Hand arthritis (8/31/2015); Left inguinal pain (1/17/2018); and Routine general medical examination at a health care facility (1/29/2016).    ROS    Patient  "denies significant change in strength, weight or appetite.  No significant lightheadedness or headaches.  No change in vision, hearing, or swallowing.  No new dyspnea, coughing, chest pain, or palpitations.  No indigestion, abdominal pain, or change in bowel habits.  No change in urinating.  He has nocturia x3-4.  No new ankle swelling.       Objective:     PE:  /70 (BP Location: Left arm, Patient Position: Sitting)   Pulse 76   Temp 36.3 °C (97.3 °F)   Ht 1.88 m (6' 2\")   Wt 90.3 kg (199 lb)   SpO2 96%   BMI 25.55 kg/m²    Neck is supple without significant lymphadenopathy or masses.  Lungs are clear with normal breath sounds without wheezes or rales .  Cardiovascular: peripheral circulation is satisfactory, heart sounds are unchanged and unremarkable.  Abdomen is soft, without masses or tenderness, with normal bowel sounds.  Extremities are without significant edema, cyanosis or deformity.      Assessment and Plan:   The following treatment plan was discussed  1. Nocturia      We will try him on Flomax 0.4 mg daily.  He was instructed in appropriate use and potential side effects.  He will report back.   2. Chronic left shoulder pain      He will try taking Aleve 2 tablets twice a day for 1 week, then report back.  I think this is primarily from tense muscles.   3. Flu vaccine need  Flu Quad Inj >3 Year Pre-Filled PF    He will receive flu vaccine today.       Followup: He will keep his next scheduled appointment or contact us as needed sooner.           "

## 2019-01-15 DIAGNOSIS — E78.5 HYPERLIPIDEMIA, UNSPECIFIED HYPERLIPIDEMIA TYPE: ICD-10-CM

## 2019-01-15 DIAGNOSIS — E03.9 HYPOTHYROIDISM, UNSPECIFIED TYPE: ICD-10-CM

## 2019-01-15 DIAGNOSIS — Z12.5 SCREENING FOR PROSTATE CANCER: ICD-10-CM

## 2019-01-16 ENCOUNTER — HOSPITAL ENCOUNTER (OUTPATIENT)
Dept: LAB | Facility: MEDICAL CENTER | Age: 64
End: 2019-01-16
Attending: INTERNAL MEDICINE
Payer: COMMERCIAL

## 2019-01-16 DIAGNOSIS — E78.5 HYPERLIPIDEMIA, UNSPECIFIED HYPERLIPIDEMIA TYPE: ICD-10-CM

## 2019-01-16 DIAGNOSIS — Z12.5 SCREENING FOR PROSTATE CANCER: ICD-10-CM

## 2019-01-16 DIAGNOSIS — E03.9 HYPOTHYROIDISM, UNSPECIFIED TYPE: ICD-10-CM

## 2019-01-16 LAB
CHOLEST SERPL-MCNC: 105 MG/DL (ref 100–199)
HDLC SERPL-MCNC: 37 MG/DL
LDLC SERPL CALC-MCNC: 56 MG/DL
TRIGL SERPL-MCNC: 60 MG/DL (ref 0–149)

## 2019-01-16 PROCEDURE — 84153 ASSAY OF PSA TOTAL: CPT

## 2019-01-16 PROCEDURE — 84443 ASSAY THYROID STIM HORMONE: CPT

## 2019-01-16 PROCEDURE — 80061 LIPID PANEL: CPT

## 2019-01-16 PROCEDURE — 36415 COLL VENOUS BLD VENIPUNCTURE: CPT

## 2019-01-17 LAB
PSA SERPL-MCNC: 0.56 NG/ML (ref 0–4)
TSH SERPL DL<=0.005 MIU/L-ACNC: 3.25 UIU/ML (ref 0.38–5.33)

## 2019-01-18 ENCOUNTER — OFFICE VISIT (OUTPATIENT)
Dept: CARDIOLOGY | Facility: MEDICAL CENTER | Age: 64
End: 2019-01-18
Payer: COMMERCIAL

## 2019-01-18 VITALS
OXYGEN SATURATION: 95 % | HEART RATE: 76 BPM | BODY MASS INDEX: 25.54 KG/M2 | DIASTOLIC BLOOD PRESSURE: 76 MMHG | HEIGHT: 74 IN | WEIGHT: 199 LBS | SYSTOLIC BLOOD PRESSURE: 110 MMHG

## 2019-01-18 DIAGNOSIS — I71.21 THORACIC ASCENDING AORTIC ANEURYSM (HCC): ICD-10-CM

## 2019-01-18 DIAGNOSIS — I63.9 CRYPTOGENIC STROKE (HCC): ICD-10-CM

## 2019-01-18 DIAGNOSIS — I65.23 ATHEROSCLEROSIS OF BOTH CAROTID ARTERIES: ICD-10-CM

## 2019-01-18 DIAGNOSIS — I63.9 CEREBROVASCULAR ACCIDENT (CVA), UNSPECIFIED MECHANISM (HCC): ICD-10-CM

## 2019-01-18 PROCEDURE — 99204 OFFICE O/P NEW MOD 45 MIN: CPT | Performed by: INTERNAL MEDICINE

## 2019-01-18 RX ORDER — ASPIRIN 81 MG/1
81 TABLET ORAL
Refills: 0 | COMMUNITY
Start: 2019-01-11 | End: 2019-02-11 | Stop reason: SDUPTHER

## 2019-01-18 RX ORDER — ATORVASTATIN CALCIUM 80 MG/1
40 TABLET, FILM COATED ORAL ONCE
Refills: 0 | COMMUNITY
Start: 2019-01-11 | End: 2019-02-11 | Stop reason: SDUPTHER

## 2019-01-18 RX ORDER — M-VIT,TX,IRON,MINS/CALC/FOLIC 27MG-0.4MG
1 TABLET ORAL DAILY
COMMUNITY
End: 2019-09-27

## 2019-01-18 NOTE — PROGRESS NOTES
No chief complaint on file.      Subjective:   Steve Burnham is a 63 y.o. male who presents today for initial evaluation regarding a history of cryptogenic stroke.  He was recently discharged from [facility].  Records arrive after the visit is completed.  He had the acute onset of right hand clumsiness which resolved.  CT of the head and neck appears to represent mild atherosclerotic carotid disease and a flow void in the right MCA.  CT of the head revealed an old cerebellar infarct on the right and an acute appearing left posterior parietal and posterior frontal lobes infarctions at least 7 days old.  In the case he had an MRI but those records were not sent.  Had an echocardiogram which showed dilated ascending aorta 5 cm, agitated saline administration demonstrating a right to left shunt with Valsalva.  I did not receive records of any hypercoagulable workup.  He did not have any documented arrhythmias during his hospital stay.  Again he has had complete resolution of his symptoms.  He is a lifelong non-smoker who drinks occasionally does not do drugs has no family history of precocious CAD and currently works as a dentist in town.    Denies any other cardiovascular symptoms including chest pain, shortness of breath, dyspnea on exertion, lightheadedness, syncope or presyncope, lower extremity edema, PND, orthopnea or palpitations.  Very active and in fact has returned to work.    Past Medical History:   Diagnosis Date   • Cataract     bilat IOL   • Chronic constipation 1/29/2016   • Disorder of thyroid    • Hand arthritis 8/31/2015   • Left inguinal pain 1/17/2018   • Routine general medical examination at a health care facility 1/29/2016 1/29/16     Past Surgical History:   Procedure Laterality Date   • INGUINAL HERNIA REPAIR Left 6/13/2018    Procedure: INGUINAL HERNIA REPAIR;  Surgeon: Eb Goyal M.D.;  Location: SURGERY [facility];  Service: General   • ROTATOR CUFF  "REPAIR Right 2013   • ROTATOR CUFF REPAIR Left 2008   • OTHER ABDOMINAL SURGERY  2006    R inguinal hernia   • COLONOSCOPY     • OTHER      sunny iol   • TONSILLECTOMY      at age 6 yr     Family History   Problem Relation Age of Onset   • Hypertension Father    • Thyroid Sister    • Hypertension Brother      Social History     Social History   • Marital status:      Spouse name: N/A   • Number of children: N/A   • Years of education: N/A     Occupational History   • Not on file.     Social History Main Topics   • Smoking status: Never Smoker   • Smokeless tobacco: Never Used   • Alcohol use No   • Drug use: No   • Sexual activity: Not on file     Other Topics Concern   • Not on file     Social History Narrative   • No narrative on file     No Known Allergies  Outpatient Encounter Prescriptions as of 1/18/2019   Medication Sig Dispense Refill   • atorvastatin (LIPITOR) 80 MG tablet Take 80 mg by mouth.  0   • aspirin 81 MG EC tablet Take 81 mg by mouth every day.  0   • Glucosamine HCl (GLUCOSAMINE PO) Take  by mouth.     • therapeutic multivitamin-minerals (THERAGRAN-M) Tab Take 1 Tab by mouth every day.     • levothyroxine (SYNTHROID) 25 MCG Tab TAKE 1 TAB BY MOUTH EVERY MORNING ON AN EMPTY STOMACH. 90 Tab 3   • polyethylene glycol 3350 (MIRALAX) Powder Take 17 g by mouth every day.     • tamsulosin (FLOMAX) 0.4 MG capsule Take 1 Cap by mouth ONE-HALF HOUR AFTER BREAKFAST. (Patient not taking: Reported on 1/18/2019) 30 Cap 11     No facility-administered encounter medications on file as of 1/18/2019.      Review of Systems   All other systems reviewed and are negative.       Objective:   /76 (BP Location: Left arm, Patient Position: Sitting, BP Cuff Size: Adult)   Pulse 76   Ht 1.88 m (6' 2\")   Wt 90.3 kg (199 lb)   SpO2 95%   BMI 25.55 kg/m²     Physical Exam   Constitutional: He is oriented to person, place, and time. He appears well-developed and well-nourished. No distress.   Appears younger " than stated age.  Fit.   HENT:   Head: Normocephalic and atraumatic.   Right Ear: External ear normal.   Left Ear: External ear normal.   Eyes: Pupils are equal, round, and reactive to light. Conjunctivae and EOM are normal. Right eye exhibits no discharge. Left eye exhibits no discharge. No scleral icterus.   Neck: Normal range of motion. Neck supple. No JVD present. No tracheal deviation present. No thyromegaly present.   Cardiovascular: Normal rate, regular rhythm and intact distal pulses.  PMI is not displaced.  Exam reveals no gallop and no friction rub.    No murmur heard.  Pulses:       Carotid pulses are 2+ on the right side, and 2+ on the left side.       Radial pulses are 2+ on the left side.        Popliteal pulses are 2+ on the right side, and 2+ on the left side.        Dorsalis pedis pulses are 2+ on the right side, and 2+ on the left side.        Posterior tibial pulses are 2+ on the right side, and 2+ on the left side.   Pulmonary/Chest: Effort normal and breath sounds normal. No respiratory distress. He has no wheezes. He has no rales. He exhibits no tenderness.   Abdominal: Soft. Bowel sounds are normal. He exhibits no distension. There is no tenderness.   Musculoskeletal: Normal range of motion. He exhibits no edema, tenderness or deformity.   Neurological: He is alert and oriented to person, place, and time. No cranial nerve deficit (cranial nerves II through XII grossly intact). Coordination normal.   Skin: Skin is warm and dry. No rash noted. He is not diaphoretic. No erythema. No pallor.   Psychiatric: He has a normal mood and affect. His behavior is normal. Thought content normal.   Vitals reviewed.    LABS:  Lab Results   Component Value Date/Time    CHOLSTRLTOT 105 01/16/2019 10:07 AM    LDL 56 01/16/2019 10:07 AM    HDL 37 (A) 01/16/2019 10:07 AM    TRIGLYCERIDE 60 01/16/2019 10:07 AM       Lab Results   Component Value Date/Time    WBC 6.9 04/27/2018 02:53 PM    RBC 4.83 04/27/2018 02:53  PM    HEMOGLOBIN 15.1 04/27/2018 02:53 PM    HEMATOCRIT 44.5 04/27/2018 02:53 PM    MCV 92.1 04/27/2018 02:53 PM    NEUTSPOLYS 61.70 04/27/2018 02:53 PM    LYMPHOCYTES 27.90 04/27/2018 02:53 PM    MONOCYTES 8.90 04/27/2018 02:53 PM    EOSINOPHILS 1.00 04/27/2018 02:53 PM    BASOPHILS 0.40 04/27/2018 02:53 PM     Lab Results   Component Value Date/Time    SODIUM 138 04/27/2018 02:53 PM    POTASSIUM 3.9 04/27/2018 02:53 PM    CHLORIDE 103 04/27/2018 02:53 PM    CO2 27 04/27/2018 02:53 PM    GLUCOSE 87 04/27/2018 02:53 PM    BUN 19 04/27/2018 02:53 PM    CREATININE 0.88 04/27/2018 02:53 PM         Lab Results   Component Value Date/Time    ALKPHOSPHAT 47 04/27/2018 02:53 PM    ASTSGOT 19 04/27/2018 02:53 PM    ALTSGPT 20 04/27/2018 02:53 PM    TBILIRUBIN 0.6 04/27/2018 02:53 PM      No results found for: BNPBTYPENAT   No results found for: TSH  No results found for: PROTHROMBTM, INR     All imaging reviewed as above.  This was extensive.    Assessment:     1. Cryptogenic stroke (HCC)  Holter Monitor / Event Recorder   2. Thoracic ascending aortic aneurysm (HCC)      5.0 cm 1/2019 per outside echocardiogram   3. Atherosclerosis of both carotid arteries         Medical Decision Making:  Today's Assessment / Status / Plan:     He appears to have had strokes which appear at this time to be cryptogenic.  He has several risk factors after he left which we are unable to discuss as the records came after his visit, which include carotid atherosclerosis although it is not severe it is however bilateral.  He has not had any documented arrhythmias.  He has not had a hypercoagulable workup.  He has not been evaluated by neurology.  He has possible intracardiac shunting consistent with PFO.  He had ultrasound studies of his legs which showed no thrombus.  I would recommend long-term monitoring for a cardiac dysrhythmia and follow-up with neurology for likely hypercoagulable evaluation and discussions regarding the contribution of  his carotid disease to his strokes and drome.  Should no arrhythmias be documented and the stroke remained cryptogenic in the opinion of neurology, with a normal hypercoagulable evaluation, and consideration for further evaluation of his PFO and possible closure could be had.    Follow-up after he sees neurology and has his Holter monitor completed.

## 2019-01-21 ENCOUNTER — OFFICE VISIT (OUTPATIENT)
Dept: MEDICAL GROUP | Facility: MEDICAL CENTER | Age: 64
End: 2019-01-21
Payer: COMMERCIAL

## 2019-01-21 VITALS
WEIGHT: 200 LBS | OXYGEN SATURATION: 96 % | BODY MASS INDEX: 25.67 KG/M2 | DIASTOLIC BLOOD PRESSURE: 78 MMHG | HEART RATE: 74 BPM | SYSTOLIC BLOOD PRESSURE: 120 MMHG | HEIGHT: 74 IN | TEMPERATURE: 97.3 F

## 2019-01-21 DIAGNOSIS — E03.9 HYPOTHYROIDISM, UNSPECIFIED TYPE: ICD-10-CM

## 2019-01-21 DIAGNOSIS — I65.23 ATHEROSCLEROSIS OF BOTH CAROTID ARTERIES: ICD-10-CM

## 2019-01-21 DIAGNOSIS — Q21.10 ASD (ATRIAL SEPTAL DEFECT): ICD-10-CM

## 2019-01-21 DIAGNOSIS — I74.9 TIA DUE TO EMBOLISM (HCC): ICD-10-CM

## 2019-01-21 DIAGNOSIS — Z00.00 ROUTINE GENERAL MEDICAL EXAMINATION AT A HEALTH CARE FACILITY: ICD-10-CM

## 2019-01-21 DIAGNOSIS — I71.21 THORACIC ASCENDING AORTIC ANEURYSM (HCC): ICD-10-CM

## 2019-01-21 DIAGNOSIS — E78.5 HYPERLIPIDEMIA, UNSPECIFIED HYPERLIPIDEMIA TYPE: ICD-10-CM

## 2019-01-21 DIAGNOSIS — R35.1 NOCTURIA: ICD-10-CM

## 2019-01-21 DIAGNOSIS — G45.9 TIA DUE TO EMBOLISM (HCC): ICD-10-CM

## 2019-01-21 PROBLEM — G89.18 ACUTE POSTOPERATIVE PAIN: Status: RESOLVED | Noted: 2018-06-13 | Resolved: 2019-01-21

## 2019-01-21 PROBLEM — K40.90 INGUINAL HERNIA OF LEFT SIDE WITHOUT OBSTRUCTION OR GANGRENE: Status: RESOLVED | Noted: 2018-02-26 | Resolved: 2019-01-21

## 2019-01-21 PROBLEM — R10.32 LEFT INGUINAL PAIN: Status: RESOLVED | Noted: 2018-01-17 | Resolved: 2019-01-21

## 2019-01-21 PROBLEM — Z23 NEED FOR TDAP VACCINATION: Status: RESOLVED | Noted: 2017-01-13 | Resolved: 2019-01-21

## 2019-01-21 PROBLEM — K40.90 LEFT INGUINAL HERNIA: Status: RESOLVED | Noted: 2018-06-13 | Resolved: 2019-01-21

## 2019-01-21 PROCEDURE — 99396 PREV VISIT EST AGE 40-64: CPT | Performed by: INTERNAL MEDICINE

## 2019-01-21 ASSESSMENT — PATIENT HEALTH QUESTIONNAIRE - PHQ9: CLINICAL INTERPRETATION OF PHQ2 SCORE: 0

## 2019-01-22 NOTE — ASSESSMENT & PLAN NOTE
During evaluation for an echocardiogram he was found to have a thoracic aortic aneurysm with aortic diameter of 5 cm.

## 2019-01-22 NOTE — PROGRESS NOTES
Chief Complaint:     Steve Burnham is a 63 y.o. male who presents for a general wellness evaluation and follow-up of a transient ischemic attack as well as hyperlipidemia and hypothyroidism.    ASD (atrial septal defect)  Recently during evaluation for a transient ischemic attack he was found to have an atrial septal defect.  He is followed for this by Dr. Sr.    Atherosclerosis of both carotid arteries  During evaluation for a transient ischemic attack he was found to have atherosclerosis involving both carotid arteries.    Hyperlipidemia  He has hyperlipidemia for which she is on atorvastatin 40 mg daily.  Most recent cholesterol is 105, triglycerides 60, HDL 37, LDL 56.  He is trying to be more careful about his diet.    Hypothyroidism  He has hypothyroidism for which she is on levothyroxine.  Clinically he is euthyroid.  TSH is normal at 3.25.    Nocturia  He has nocturia x1-2.  Flomax did not seem to help so he stopped this medication.    Thoracic ascending aortic aneurysm (HCC)  During evaluation for an echocardiogram he was found to have a thoracic aortic aneurysm with aortic diameter of 5 cm.    TIA due to embolism (HCC)  He reports that on January 10 he was sitting on the couch in the evening and noticed that he could not control his right arm.  He could not hold anything in his hand.  He went to [facility] and stayed overnight for evaluation.  He was found to have an atrial septal defect.  Evaluation for DVT was negative.  He will be getting a 0 patch on February 1.  He is taking aspirin 81 mg daily.  The following morning the weakness had resolved.  He is being followed for this by Dr. Sr and will be seeing a neurologist in the near future.      Last colonoscopy: Next one is due 10/25/2019  Last Td: 1/13/2017  Hx STDs: no  Regular exercise: no     He  has a past medical history of ASD (atrial septal defect) (1/21/2019); Cataract; Chronic constipation (1/29/2016);  Disorder of thyroid; Hand arthritis (8/31/2015); Left inguinal pain (1/17/2018); Routine general medical examination at a health care facility (1/29/2016); and TIA due to embolism (HCC) (1/21/2019).  He  has a past surgical history that includes other; other abdominal surgery (2006); colonoscopy; rotator cuff repair (Right, 2013); rotator cuff repair (Left, 2008); tonsillectomy; and inguinal hernia repair (Left, 6/13/2018).  Family History   Problem Relation Age of Onset   • Hypertension Father    • Thyroid Sister    • Hypertension Brother      Social History   Substance Use Topics   • Smoking status: Never Smoker   • Smokeless tobacco: Never Used   • Alcohol use No       Current Outpatient Prescriptions   Medication Sig Dispense Refill   • atorvastatin (LIPITOR) 80 MG tablet Take 80 mg by mouth.  0   • aspirin 81 MG EC tablet Take 81 mg by mouth every day.  0   • Glucosamine HCl (GLUCOSAMINE PO) Take  by mouth.     • therapeutic multivitamin-minerals (THERAGRAN-M) Tab Take 1 Tab by mouth every day.     • levothyroxine (SYNTHROID) 25 MCG Tab TAKE 1 TAB BY MOUTH EVERY MORNING ON AN EMPTY STOMACH. 90 Tab 3   • polyethylene glycol 3350 (MIRALAX) Powder Take 17 g by mouth every day.     • tamsulosin (FLOMAX) 0.4 MG capsule Take 1 Cap by mouth ONE-HALF HOUR AFTER BREAKFAST. (Patient not taking: Reported on 1/18/2019) 30 Cap 11     No current facility-administered medications for this visit.     (including changes today)  Allergies: Patient has no known allergies.    ROS:   General:  no recent change in strength, weight, appetite, or exercise tolerance.  CNS: no significant lightheadedness, headaches, fainting spells, seizures, or change in mentation.  EENT: no significant change in vision, hearing, sense of smell, swallowing, or voice.  RESP: no significant wheezing, coughing, or dyspnea.  CV: no significant palpitations or chest pain.  G.I.: no significant indigestion, abdominal pain, or change in bowel habits.  : no  "significant change in urinating, no dysuria or hematuria, or change in sexual function, or change in testes.  EXTREM:  no significant edema, swelling, pain, or limitations of motion.  INTEG: no significant change in skin, hair or fingernails.  LYMPH: no significant adenopathy or other masses.  PSYCH: no significant anxiety or depression.        PHYSICAL EXAMINATION:  Body mass index is 25.68 kg/m².  Wt Readings from Last 4 Encounters:   01/21/19 90.7 kg (200 lb)   01/18/19 90.3 kg (199 lb)   10/29/18 90.3 kg (199 lb)   06/13/18 89.6 kg (197 lb 8.5 oz)     PE:  /78 (BP Location: Left arm, Patient Position: Sitting)   Pulse 74   Temp 36.3 °C (97.3 °F)   Ht 1.88 m (6' 2\")   Wt 90.7 kg (200 lb)   SpO2 96%   BMI 25.68 kg/m²     GEN: clean, well groomed, in no acute distress, alert.  EENT: PERRL, normal EOMs, fundi unremarkable, normal external auditory canals and tympanic membranes, pharynx unremarkable, dentition satisfactory, nose unremarkable, neck supple and without significant lymphadenopathy or masses, trachea midline, thyroid unremarkable.  LUNGS: bilateral breath sounds, without significant wheezes or rales or abnormalities in percussion.  CV:  peripheral circulation is satisfactory, pedal pulses are satisfactory, heart sounds are unremarkable.  ABD: soft, without significant masses, no hepatosplenomegaly, no tenderness, bowel sounds are normal, no significant inguinal adenopathy.  : normal external genitalia, without urethral discharge, testes without significant masses, rectal exam unremarkable, prostate of normal contour and consistency,   EXTREM:  without significant edema, cyanosis or deformity.  LYMPH:  no significant lymphadenopathy or lymphedema.  INTEG:  skin, hair, fingernails are unremarkable without significant rashes or lesions  NEURO:  essentially normal sensation, strength, gate, and cerebellar function, normal DTRs, Babinski is negative, affect is normal, oriented times " three.  PSYCH: appropriate affect and mood.        ASSESSMENT/PLAN:  1. Thoracic ascending aortic aneurysm (HCC)      Check repeat ultrasound in about 6 months if not sooner.  Control blood pressure.   2. Atherosclerosis of both carotid arteries      Maintained on statin medication.  We reviewed appropriate diet.   3. Hyperlipidemia, unspecified hyperlipidemia type      Continue Lipitor and appropriate diet.   4. Hypothyroidism, unspecified type      Continue levothyroxine, check TSH at least yearly.   5. Nocturia      He will report back if he wants to try Flomax again or has more trouble.   6. TIA due to embolism (HCC)      Go to emergency room if he has any further similar symptoms.   7. ASD (atrial septal defect)      Evaluation per Dr. Sr.   8. Routine general medical examination at a health care facility       Labs per orders  Counseling about diet, exercise, skin care  Vaccinations per orders  HM: Repeat colonoscopy later this year.  Next office visit for recheck of chronic medical conditions is due in 6 months

## 2019-01-22 NOTE — ASSESSMENT & PLAN NOTE
During evaluation for a transient ischemic attack he was found to have atherosclerosis involving both carotid arteries.

## 2019-01-22 NOTE — ASSESSMENT & PLAN NOTE
Recently during evaluation for a transient ischemic attack he was found to have an atrial septal defect.  He is followed for this by Dr. Sr.

## 2019-01-22 NOTE — ASSESSMENT & PLAN NOTE
He has hyperlipidemia for which she is on atorvastatin 40 mg daily.  Most recent cholesterol is 105, triglycerides 60, HDL 37, LDL 56.  He is trying to be more careful about his diet.

## 2019-01-22 NOTE — ASSESSMENT & PLAN NOTE
He has hypothyroidism for which she is on levothyroxine.  Clinically he is euthyroid.  TSH is normal at 3.25.

## 2019-01-22 NOTE — ASSESSMENT & PLAN NOTE
He reports that on January 10 he was sitting on the couch in the evening and noticed that he could not control his right arm.  He could not hold anything in his hand.  He went to [facility] and stayed overnight for evaluation.  He was found to have an atrial septal defect.  Evaluation for DVT was negative.  He will be getting a 0 patch on February 1.  He is taking aspirin 81 mg daily.  The following morning the weakness had resolved.  He is being followed for this by Dr. Sr and will be seeing a neurologist in the near future.

## 2019-01-31 ENCOUNTER — TELEPHONE (OUTPATIENT)
Dept: CARDIOLOGY | Facility: MEDICAL CENTER | Age: 64
End: 2019-01-31

## 2019-01-31 ENCOUNTER — NON-PROVIDER VISIT (OUTPATIENT)
Dept: CARDIOLOGY | Facility: MEDICAL CENTER | Age: 64
End: 2019-01-31
Payer: COMMERCIAL

## 2019-01-31 DIAGNOSIS — I48.0 PAF (PAROXYSMAL ATRIAL FIBRILLATION) (HCC): ICD-10-CM

## 2019-02-11 RX ORDER — ATORVASTATIN CALCIUM 80 MG/1
80 TABLET, FILM COATED ORAL DAILY
Qty: 90 TAB | Refills: 3 | Status: SHIPPED | OUTPATIENT
Start: 2019-02-11 | End: 2019-09-18

## 2019-02-11 RX ORDER — ASPIRIN 81 MG/1
81 TABLET ORAL
Qty: 100 TAB | Refills: 3 | Status: SHIPPED | OUTPATIENT
Start: 2019-02-11 | End: 2019-09-18

## 2019-02-14 ENCOUNTER — OFFICE VISIT (OUTPATIENT)
Dept: NEUROLOGY | Facility: MEDICAL CENTER | Age: 64
End: 2019-02-14
Payer: COMMERCIAL

## 2019-02-14 VITALS
HEART RATE: 77 BPM | TEMPERATURE: 98.4 F | OXYGEN SATURATION: 98 % | WEIGHT: 202 LBS | BODY MASS INDEX: 25.93 KG/M2 | HEIGHT: 74 IN | SYSTOLIC BLOOD PRESSURE: 128 MMHG | DIASTOLIC BLOOD PRESSURE: 86 MMHG

## 2019-02-14 DIAGNOSIS — I63.40 CEREBROVASCULAR ACCIDENT (CVA) DUE TO EMBOLISM OF CEREBRAL ARTERY (HCC): ICD-10-CM

## 2019-02-14 PROBLEM — G45.9 TIA DUE TO EMBOLISM (HCC): Status: RESOLVED | Noted: 2019-01-21 | Resolved: 2019-02-14

## 2019-02-14 PROBLEM — I74.9 TIA DUE TO EMBOLISM (HCC): Status: RESOLVED | Noted: 2019-01-21 | Resolved: 2019-02-14

## 2019-02-14 PROCEDURE — 99204 OFFICE O/P NEW MOD 45 MIN: CPT | Performed by: PHYSICIAN ASSISTANT

## 2019-02-14 NOTE — PROGRESS NOTES
"Subjective:      Steve Burnham is a 63 y.o. male who presents with New Patient (CVA)    Patient had a stroke at [facility].  Records related to that 1/10/19 admission are in media.    Symptoms associated with this episode:  right upper and lower extremty weakness and poor coordination.  Pt presented to [facility] and in consultation with their providers chose not to receive tPA    Symptoms have resolved without residual deficit.     Patient was not seen in the hospital by neurology at [facility].      Stroke Prevention Medications taking currently: aspirin    (PCP) Primary Doctor:  Allan Jones MD    [facility] reviewed    Medications and Allergies reviewed    Social: lives with his wife, who is present today.  Works  -  Endodontist still in practice    Test Results Reviewed:    MRI:  Left posterior parietal and posterior frontal lobe strokes and old cerebellar stroke    MRA Neck:  No carotid bifurcation stenosis    CTA Head: <50% bilateral carotid artery stenosis    Echo: no hemodynamically significant stenosis, no clot observed, + PFO (opening between the chambers of the heart)    OTHER:  LE US - no DVT (blood clots) in lower extremities detected on ultrasound      DX:  Stroke       Personal Risk factors associated with recurrent stroke:    Some risk factors for stroke are \"non-modifiable\" meaning we cannot change them.  Examples of these types of risk factors are:    *   Age - once we turn 55, stroke becomes more common.  It is associated with aging and in fact every decade over 55 our stroke risk doubles.  * Gender - Men have more strokes than women, although this gender difference is only slight  * Ethnicity and/or family history:  if your parents, grandparents, siblings or children have had stroke or if you have ancestors of , , or  descent, you may have inherited some genes pre-disposing your toward stroke    To reduce your risk of recurrent stroke, we want to focus on risk factors we " can modify.  Any of the checked items below are your personal risk factors and you should work with your PCP (primary care provider) to get them to the goal (goals are in bold).   These risk factors are:     ___Diabetes - goal HA1c is <7.0%: Current:   ha1c not measured, but no abnormal blood sugar (fasting) in the hospital    ___Hypertension - goal is <140  top number at all times.   Current: 128/86  Recommend taking bp once monthly at minimum, and taking log to PCP at every visit.    Begin Blood pressure medication low dose and/or avoid salt to keep BP <140 all times    ___Hyperlipidemia - goal LDL is <70.  Current: 53.  Continue taking cholesterol medication as prescribed.  You can also lower your cholesterol by going on a low cholesterol diet.  You should discuss this with your primary care provider if you wish to also reduce your cholesterol by changing your diet.  In general to reduce cholesterol - eat less cheese, and reduce intake of pork, shrimp, and beef.    ___Smoking: denies    ___Overweight:  Current BMI -25    Body Mass Index (BMI) is your height compared to your weight.  Goal BMI to reduce risk of recurrent stroke is <25   BMI 25-27 diet/exercise to get to goal BMI.    BMI >27 patient should begin with goal of 10% weight loss  Work on this risk factor aggressively with your PCP             ___Physical Inactivity: 30 minutes of moderate exercise most days, but at least three times per week. Moderate exercise can be walking, swimming, cycling.  Work up to goal by setting realistic goal and then increasing it weekly or monthly.    _?__Atrial Fibrillation: no History of afib - but stroke appears embolic in origin.  ZIO patch test results are pending    ___Sleep Apnea: never diagnosed with sleep apnea.    If you snore and have episodes where you stop breathing, speak to your PCP about whether a pulse oximetry test or referral for a sleep study are needed.   Untreated sleep apnea is associated with  "recurrent stroke    ___Alcohol or Drug use:  men can have a maximum of 2 drinks daily  - preferably red wine;  Recreational Drug use:  Marijuana, meth, cocaine all associated with increased stroke risk and should be avoided for those who use recreational drugs.    ___Testosterone Use: Denies              HPI    ROS       Objective:     /86   Pulse 77   Temp 36.9 °C (98.4 °F) (Temporal)   Ht 1.88 m (6' 2\")   Wt 91.6 kg (202 lb)   SpO2 98%   BMI 25.94 kg/m²      Physical Exam   Constitutional: He is oriented to person, place, and time. He appears well-developed and well-nourished.   HENT:   Head: Normocephalic.   Eyes: Pupils are equal, round, and reactive to light. EOM are normal.   Pulmonary/Chest: Effort normal.   Neurological: He is alert and oriented to person, place, and time. No cranial nerve deficit or sensory deficit. Coordination normal.   Skin: Skin is warm. No rash noted. No pallor.   Psychiatric: He has a normal mood and affect. His behavior is normal. Judgment and thought content normal.   Vitals reviewed.                Assessment/Plan:       Ischemic stroke - mechanism embolic stroke unknown source.  Possibly due to opening between the chambers of the heart (might need closure) versus intermittent atrial fibrillation (ZIO patch results pending)    Work with PCP and your cardiologist on risk factors checked above to reduce risk of recurrent stroke.    Medication you are taking to thin your blood and reduce your risk of recurrent stroke is aspirin.  Do not stop unless directed to do so by your healthcare provider    Counseled on when to call 911 and if desired additional information on stroke was provided    Return to our office:  not required - this is a one time visit to review your risk factors for stroke so you can discuss them with your primary care provider.              "

## 2019-02-22 DIAGNOSIS — Z79.899 MEDICATION MANAGEMENT: ICD-10-CM

## 2019-02-22 RX ORDER — LEVOTHYROXINE SODIUM 0.03 MG/1
25 TABLET ORAL
Qty: 90 TAB | Refills: 3 | Status: SHIPPED | OUTPATIENT
Start: 2019-02-22 | End: 2020-03-13

## 2019-02-26 PROCEDURE — 0298T PR EXT ECG > 48HR TO 21 DAY REVIEW AND INTERPRETATN: CPT | Performed by: INTERNAL MEDICINE

## 2019-02-26 PROCEDURE — 0296T PR EXT ECG > 48HR TO 21 DAY RCRD W/CONECT INTL RCRD: CPT | Performed by: INTERNAL MEDICINE

## 2019-02-28 ENCOUNTER — TELEPHONE (OUTPATIENT)
Dept: CARDIOLOGY | Facility: MEDICAL CENTER | Age: 64
End: 2019-02-28

## 2019-02-28 NOTE — TELEPHONE ENCOUNTER
Pt's Zio patch showed PAF. [employer] message sent to pt with results and instructing him to make a follow up appointment with an APRN to discuss.

## 2019-03-11 ENCOUNTER — OFFICE VISIT (OUTPATIENT)
Dept: CARDIOLOGY | Facility: MEDICAL CENTER | Age: 64
End: 2019-03-11
Payer: COMMERCIAL

## 2019-03-11 VITALS
SYSTOLIC BLOOD PRESSURE: 122 MMHG | DIASTOLIC BLOOD PRESSURE: 77 MMHG | HEIGHT: 74 IN | HEART RATE: 60 BPM | BODY MASS INDEX: 25.41 KG/M2 | WEIGHT: 198 LBS | OXYGEN SATURATION: 98 %

## 2019-03-11 DIAGNOSIS — Z79.899 HIGH RISK MEDICATION USE: ICD-10-CM

## 2019-03-11 DIAGNOSIS — I71.21 THORACIC ASCENDING AORTIC ANEURYSM (HCC): ICD-10-CM

## 2019-03-11 DIAGNOSIS — I50.9 HEART FAILURE, UNSPECIFIED HF CHRONICITY, UNSPECIFIED HEART FAILURE TYPE (HCC): ICD-10-CM

## 2019-03-11 DIAGNOSIS — I65.23 ATHEROSCLEROSIS OF BOTH CAROTID ARTERIES: ICD-10-CM

## 2019-03-11 DIAGNOSIS — I63.40 CEREBROVASCULAR ACCIDENT (CVA) DUE TO EMBOLISM OF CEREBRAL ARTERY (HCC): ICD-10-CM

## 2019-03-11 DIAGNOSIS — Q21.12 PFO (PATENT FORAMEN OVALE): ICD-10-CM

## 2019-03-11 DIAGNOSIS — I48.0 PAF (PAROXYSMAL ATRIAL FIBRILLATION) (HCC): ICD-10-CM

## 2019-03-11 PROBLEM — Q21.10 ASD (ATRIAL SEPTAL DEFECT): Status: RESOLVED | Noted: 2019-01-21 | Resolved: 2019-03-11

## 2019-03-11 PROCEDURE — 99215 OFFICE O/P EST HI 40 MIN: CPT | Performed by: INTERNAL MEDICINE

## 2019-03-11 NOTE — PROGRESS NOTES
Chief Complaint   Patient presents with   • Hyperlipidemia     Follow up       Subjective:   Steve Burnham is a 63 y.o. male who presents today for follow-up regarding a history of cryptogenic stroke.  He was recently discharged from [facility].  He had the acute onset of right hand clumsiness which resolved.  CT of the head and neck appears to represent mild atherosclerotic carotid disease and a flow void in the right MCA.  CT of the head revealed an old cerebellar infarct on the right and an acute appearing left posterior parietal and posterior frontal lobes infarctions at least 7 days old.  In the case he had an MRI but those records were not sent.  Had an echocardiogram which showed dilated ascending aorta 5 cm, agitated saline administration demonstrating a right to left shunt with Valsalva.  I did not receive records of any hypercoagulable workup.  He did not have any documented arrhythmias during his hospital stay.  Again he has had complete resolution of his symptoms.  He is a lifelong non-smoker who drinks occasionally does not do drugs has no family history of precocious CAD and currently works as a dentist in town.    In the interim he feels well has no recurrent neurologic issues.  Is Holter monitor returned with paroxysmal atrial for ablation which was a low burden.  He was unaware of this.    Past Medical History:   Diagnosis Date   • ASD (atrial septal defect) 1/21/2019   • Cataract     bilat IOL   • Chronic constipation 1/29/2016   • Disorder of thyroid    • Hand arthritis 8/31/2015   • Left inguinal pain 1/17/2018   • Routine general medical examination at a health care facility 1/29/2016 1/29/16   • TIA due to embolism (HCC) 1/21/2019     Past Surgical History:   Procedure Laterality Date   • INGUINAL HERNIA REPAIR Left 6/13/2018    Procedure: INGUINAL HERNIA REPAIR;  Surgeon: Eb Goyal M.D.;  Location: SURGERY [facility];  Service: General   • ROTATOR CUFF  "REPAIR Right 2013   • ROTATOR CUFF REPAIR Left 2008   • OTHER ABDOMINAL SURGERY  2006    R inguinal hernia   • COLONOSCOPY     • OTHER      sunny iol   • TONSILLECTOMY      at age 6 yr     Family History   Problem Relation Age of Onset   • Hypertension Father    • Thyroid Sister    • Hypertension Brother      Social History     Social History   • Marital status:      Spouse name: N/A   • Number of children: N/A   • Years of education: N/A     Occupational History   • Not on file.     Social History Main Topics   • Smoking status: Never Smoker   • Smokeless tobacco: Never Used   • Alcohol use No   • Drug use: No   • Sexual activity: Not on file     Other Topics Concern   • Not on file     Social History Narrative   • No narrative on file     No Known Allergies  Outpatient Encounter Prescriptions as of 3/11/2019   Medication Sig Dispense Refill   • apixaban (ELIQUIS) 5mg Tab Take 1 Tab by mouth 2 Times a Day. 60 Tab 11   • levothyroxine (SYNTHROID) 25 MCG Tab Take 1 Tab by mouth Every morning on an empty stomach. 90 Tab 3   • atorvastatin (LIPITOR) 80 MG tablet Take 1 Tab by mouth every day. 90 Tab 3   • aspirin 81 MG EC tablet Take 1 Tab by mouth every day. 100 Tab 3   • Glucosamine HCl (GLUCOSAMINE PO) Take  by mouth.     • therapeutic multivitamin-minerals (THERAGRAN-M) Tab Take 1 Tab by mouth every day.     • polyethylene glycol 3350 (MIRALAX) Powder Take 17 g by mouth every day.       No facility-administered encounter medications on file as of 3/11/2019.      Review of Systems   All other systems reviewed and are negative.       Objective:   /77 (BP Location: Left arm, Patient Position: Sitting, BP Cuff Size: Adult)   Pulse 60   Ht 1.88 m (6' 2\")   Wt 89.8 kg (198 lb)   SpO2 98%   BMI 25.42 kg/m²     Physical Exam   Constitutional: He is oriented to person, place, and time. He appears well-developed and well-nourished. No distress.   Appears younger than stated age.  Fit.   HENT:   Head: " Normocephalic and atraumatic.   Right Ear: External ear normal.   Left Ear: External ear normal.   Eyes: Pupils are equal, round, and reactive to light. Conjunctivae and EOM are normal. Right eye exhibits no discharge. Left eye exhibits no discharge. No scleral icterus.   Neck: Normal range of motion. Neck supple. No JVD present. No tracheal deviation present. No thyromegaly present.   Cardiovascular: Normal rate, regular rhythm and intact distal pulses.  PMI is not displaced.  Exam reveals no gallop and no friction rub.    No murmur heard.  Pulses:       Carotid pulses are 2+ on the right side, and 2+ on the left side.       Radial pulses are 2+ on the left side.        Popliteal pulses are 2+ on the right side, and 2+ on the left side.        Dorsalis pedis pulses are 2+ on the right side, and 2+ on the left side.        Posterior tibial pulses are 2+ on the right side, and 2+ on the left side.   Pulmonary/Chest: Effort normal and breath sounds normal. No respiratory distress. He has no wheezes. He has no rales. He exhibits no tenderness.   Abdominal: Soft. Bowel sounds are normal. He exhibits no distension. There is no tenderness.   Musculoskeletal: Normal range of motion. He exhibits no edema, tenderness or deformity.   Neurological: He is alert and oriented to person, place, and time. No cranial nerve deficit (cranial nerves II through XII grossly intact). Coordination normal.   Skin: Skin is warm and dry. No rash noted. He is not diaphoretic. No erythema. No pallor.   Psychiatric: He has a normal mood and affect. His behavior is normal. Thought content normal.   Vitals reviewed.  No change in physical exam since prior 1/18/2019    LABS:  Lab Results   Component Value Date/Time    CHOLSTRLTOT 105 01/16/2019 10:07 AM    LDL 56 01/16/2019 10:07 AM    HDL 37 (A) 01/16/2019 10:07 AM    TRIGLYCERIDE 60 01/16/2019 10:07 AM       Lab Results   Component Value Date/Time    WBC 6.9 04/27/2018 02:53 PM    RBC 4.83  04/27/2018 02:53 PM    HEMOGLOBIN 15.1 04/27/2018 02:53 PM    HEMATOCRIT 44.5 04/27/2018 02:53 PM    MCV 92.1 04/27/2018 02:53 PM    NEUTSPOLYS 61.70 04/27/2018 02:53 PM    LYMPHOCYTES 27.90 04/27/2018 02:53 PM    MONOCYTES 8.90 04/27/2018 02:53 PM    EOSINOPHILS 1.00 04/27/2018 02:53 PM    BASOPHILS 0.40 04/27/2018 02:53 PM     Lab Results   Component Value Date/Time    SODIUM 138 04/27/2018 02:53 PM    POTASSIUM 3.9 04/27/2018 02:53 PM    CHLORIDE 103 04/27/2018 02:53 PM    CO2 27 04/27/2018 02:53 PM    GLUCOSE 87 04/27/2018 02:53 PM    BUN 19 04/27/2018 02:53 PM    CREATININE 0.88 04/27/2018 02:53 PM         Lab Results   Component Value Date/Time    ALKPHOSPHAT 47 04/27/2018 02:53 PM    ASTSGOT 19 04/27/2018 02:53 PM    ALTSGPT 20 04/27/2018 02:53 PM    TBILIRUBIN 0.6 04/27/2018 02:53 PM      No results found for: BNPBTYPENAT   No results found for: TSH  No results found for: PROTHROMBTM, INR     Echocardiogram reviewed in media.  Normal aside from thoracic aortic aneurysm.    HOLTER SUMMARY (2/12/2019):  Sinus rhythm with supra ventricular ectopy and brief episodes of paroxysmal atrial fibrillation.      Assessment:     1. Cerebrovascular accident (CVA) due to embolism of cerebral artery (HCC)  apixaban (ELIQUIS) 5mg Tab   2. PAF (paroxysmal atrial fibrillation) ([facility])     3. PFO (patent foramen ovale)     4. Thoracic ascending aortic aneurysm (HCC)  CT-CTA COMPLETE THORACOABDOMINAL AORTA    CANCELED: CT-THORACIC AORTA EVALUATION   5. Atherosclerosis of both carotid arteries     6. High risk medication use         Medical Decision Making:  Today's Assessment / Status / Plan:     He has no recurrent symptoms and is currently on aspirin monotherapy.  Investigations thus far have revealed both mild carotid atherosclerosis from outside records as well as a likely patent foramen ovale and paroxysmal atrial fibrillation on Holter monitoring.  We discussed that this was brief.  We discussed mechanical treatment of his  stroke risk with left atrial appendage occluder and PFO closure.  We also discussed that oral anticoagulation would be equivalent and the preferred initial strategy as he has multiple vascular and nonvascular risk factors for stroke.  We decided on a trial of anticoagulation for both his PAF and PFO management.  It is up to him and neurology with her to continue the low-dose antiplatelet therapy for his mild atherosclerosis of his carotids which I think would be reasonable given his very low bleeding risk.    1.  Eliquis 5mg twice daily  2.  CTA thoracic aorta clarify the size of his aortic aneurysm seen on outside echocardiogram.    Follow-up in 6 months

## 2019-04-08 DIAGNOSIS — I71.21 THORACIC ASCENDING AORTIC ANEURYSM (HCC): ICD-10-CM

## 2019-09-17 ENCOUNTER — OFFICE VISIT (OUTPATIENT)
Dept: URGENT CARE | Facility: PHYSICIAN GROUP | Age: 64
End: 2019-09-17
Payer: COMMERCIAL

## 2019-09-17 VITALS
BODY MASS INDEX: 27.17 KG/M2 | TEMPERATURE: 99.2 F | HEIGHT: 73 IN | SYSTOLIC BLOOD PRESSURE: 120 MMHG | WEIGHT: 205 LBS | RESPIRATION RATE: 16 BRPM | HEART RATE: 83 BPM | OXYGEN SATURATION: 94 % | DIASTOLIC BLOOD PRESSURE: 74 MMHG

## 2019-09-17 DIAGNOSIS — R60.9 EDEMA, UNSPECIFIED TYPE: ICD-10-CM

## 2019-09-17 LAB
APPEARANCE UR: CLEAR
BILIRUB UR STRIP-MCNC: NORMAL MG/DL
COLOR UR AUTO: YELLOW
GLUCOSE UR STRIP.AUTO-MCNC: NORMAL MG/DL
KETONES UR STRIP.AUTO-MCNC: NORMAL MG/DL
LEUKOCYTE ESTERASE UR QL STRIP.AUTO: NORMAL
NITRITE UR QL STRIP.AUTO: NORMAL
PH UR STRIP.AUTO: 5.5 [PH] (ref 5–8)
PROT UR QL STRIP: NORMAL MG/DL
RBC UR QL AUTO: NORMAL
SP GR UR STRIP.AUTO: 1.01
UROBILINOGEN UR STRIP-MCNC: 0.2 MG/DL

## 2019-09-17 PROCEDURE — 99204 OFFICE O/P NEW MOD 45 MIN: CPT | Performed by: NURSE PRACTITIONER

## 2019-09-17 PROCEDURE — 81002 URINALYSIS NONAUTO W/O SCOPE: CPT | Performed by: NURSE PRACTITIONER

## 2019-09-17 RX ORDER — CLOBAZAM 10 MG/1
5 TABLET ORAL
COMMUNITY
Start: 2019-09-11 | End: 2019-09-18

## 2019-09-17 RX ORDER — LEVETIRACETAM 500 MG/1
2000 TABLET ORAL
COMMUNITY
Start: 2019-09-09 | End: 2020-09-08

## 2019-09-17 ASSESSMENT — ENCOUNTER SYMPTOMS
EDEMA: 1
FEVER: 0
NEUROLOGICAL NEGATIVE: 1
MUSCULOSKELETAL NEGATIVE: 1
CARDIOVASCULAR NEGATIVE: 1
CONSTITUTIONAL NEGATIVE: 1
SHORTNESS OF BREATH: 0
RESPIRATORY NEGATIVE: 1

## 2019-09-18 ENCOUNTER — APPOINTMENT (OUTPATIENT)
Dept: RADIOLOGY | Facility: MEDICAL CENTER | Age: 64
DRG: 606 | End: 2019-09-18
Payer: COMMERCIAL

## 2019-09-18 ENCOUNTER — HOSPITAL ENCOUNTER (OUTPATIENT)
Dept: LAB | Facility: MEDICAL CENTER | Age: 64
End: 2019-09-18
Attending: NURSE PRACTITIONER
Payer: COMMERCIAL

## 2019-09-18 ENCOUNTER — APPOINTMENT (OUTPATIENT)
Dept: RADIOLOGY | Facility: MEDICAL CENTER | Age: 64
DRG: 606 | End: 2019-09-18
Attending: EMERGENCY MEDICINE
Payer: COMMERCIAL

## 2019-09-18 ENCOUNTER — HOSPITAL ENCOUNTER (INPATIENT)
Facility: MEDICAL CENTER | Age: 64
LOS: 1 days | DRG: 606 | End: 2019-09-19
Attending: EMERGENCY MEDICINE | Admitting: INTERNAL MEDICINE
Payer: COMMERCIAL

## 2019-09-18 ENCOUNTER — TELEPHONE (OUTPATIENT)
Dept: URGENT CARE | Facility: PHYSICIAN GROUP | Age: 64
End: 2019-09-18

## 2019-09-18 DIAGNOSIS — D61.818 PANCYTOPENIA (HCC): ICD-10-CM

## 2019-09-18 DIAGNOSIS — R60.9 EDEMA, UNSPECIFIED TYPE: ICD-10-CM

## 2019-09-18 DIAGNOSIS — D70.9 NEUTROPENIA, UNSPECIFIED TYPE (HCC): ICD-10-CM

## 2019-09-18 DIAGNOSIS — L27.0 DRUG RASH: ICD-10-CM

## 2019-09-18 DIAGNOSIS — R21 RASH: ICD-10-CM

## 2019-09-18 LAB
ALBUMIN SERPL BCP-MCNC: 3.3 G/DL (ref 3.2–4.9)
ALBUMIN SERPL BCP-MCNC: 3.8 G/DL (ref 3.2–4.9)
ALBUMIN/GLOB SERPL: 1.7 G/DL
ALBUMIN/GLOB SERPL: 2.2 G/DL
ALP SERPL-CCNC: 42 U/L (ref 30–99)
ALP SERPL-CCNC: 45 U/L (ref 30–99)
ALT SERPL-CCNC: 49 U/L (ref 2–50)
ALT SERPL-CCNC: 54 U/L (ref 2–50)
ANION GAP SERPL CALC-SCNC: 7 MMOL/L (ref 0–11.9)
ANION GAP SERPL CALC-SCNC: 8 MMOL/L (ref 0–11.9)
ANISOCYTOSIS BLD QL SMEAR: ABNORMAL
ANISOCYTOSIS BLD QL SMEAR: ABNORMAL
APPEARANCE UR: CLEAR
APTT PPP: 33.3 SEC (ref 24.7–36)
AST SERPL-CCNC: 27 U/L (ref 12–45)
AST SERPL-CCNC: 30 U/L (ref 12–45)
BASOPHILS # BLD AUTO: 0 % (ref 0–1.8)
BASOPHILS # BLD AUTO: 0.9 % (ref 0–1.8)
BASOPHILS # BLD: 0 K/UL (ref 0–0.12)
BASOPHILS # BLD: 0.02 K/UL (ref 0–0.12)
BILIRUB SERPL-MCNC: 0.6 MG/DL (ref 0.1–1.5)
BILIRUB SERPL-MCNC: 0.6 MG/DL (ref 0.1–1.5)
BILIRUB UR QL STRIP.AUTO: NEGATIVE
BUN SERPL-MCNC: 12 MG/DL (ref 8–22)
BUN SERPL-MCNC: 13 MG/DL (ref 8–22)
CALCIUM SERPL-MCNC: 8.5 MG/DL (ref 8.5–10.5)
CALCIUM SERPL-MCNC: 8.6 MG/DL (ref 8.5–10.5)
CHLORIDE SERPL-SCNC: 105 MMOL/L (ref 96–112)
CHLORIDE SERPL-SCNC: 106 MMOL/L (ref 96–112)
CO2 SERPL-SCNC: 26 MMOL/L (ref 20–33)
CO2 SERPL-SCNC: 27 MMOL/L (ref 20–33)
COLOR UR: YELLOW
CREAT SERPL-MCNC: 0.78 MG/DL (ref 0.5–1.4)
CREAT SERPL-MCNC: 0.8 MG/DL (ref 0.5–1.4)
EOSINOPHIL # BLD AUTO: 0.04 K/UL (ref 0–0.51)
EOSINOPHIL # BLD AUTO: 0.07 K/UL (ref 0–0.51)
EOSINOPHIL NFR BLD: 1.7 % (ref 0–6.9)
EOSINOPHIL NFR BLD: 3.5 % (ref 0–6.9)
ERYTHROCYTE [DISTWIDTH] IN BLOOD BY AUTOMATED COUNT: 63.3 FL (ref 35.9–50)
ERYTHROCYTE [DISTWIDTH] IN BLOOD BY AUTOMATED COUNT: 64 FL (ref 35.9–50)
GLOBULIN SER CALC-MCNC: 1.7 G/DL (ref 1.9–3.5)
GLOBULIN SER CALC-MCNC: 2 G/DL (ref 1.9–3.5)
GLUCOSE SERPL-MCNC: 100 MG/DL (ref 65–99)
GLUCOSE SERPL-MCNC: 92 MG/DL (ref 65–99)
GLUCOSE UR STRIP.AUTO-MCNC: NEGATIVE MG/DL
HCT VFR BLD AUTO: 34.1 % (ref 42–52)
HCT VFR BLD AUTO: 36.9 % (ref 42–52)
HGB BLD-MCNC: 11.3 G/DL (ref 14–18)
HGB BLD-MCNC: 12.4 G/DL (ref 14–18)
INR PPP: 0.95 (ref 0.87–1.13)
KETONES UR STRIP.AUTO-MCNC: NEGATIVE MG/DL
LACTATE BLD-SCNC: 1.3 MMOL/L (ref 0.5–2)
LACTATE BLD-SCNC: 1.3 MMOL/L (ref 0.5–2)
LEUKOCYTE ESTERASE UR QL STRIP.AUTO: NEGATIVE
LG PLATELETS BLD QL SMEAR: NORMAL
LYMPHOCYTES # BLD AUTO: 0.54 K/UL (ref 1–4.8)
LYMPHOCYTES # BLD AUTO: 0.8 K/UL (ref 1–4.8)
LYMPHOCYTES NFR BLD: 25.7 % (ref 22–41)
LYMPHOCYTES NFR BLD: 38.3 % (ref 22–41)
MANUAL DIFF BLD: ABNORMAL
MANUAL DIFF BLD: ABNORMAL
MCH RBC QN AUTO: 32.8 PG (ref 27–33)
MCH RBC QN AUTO: 33.4 PG (ref 27–33)
MCHC RBC AUTO-ENTMCNC: 33.1 G/DL (ref 33.7–35.3)
MCHC RBC AUTO-ENTMCNC: 33.6 G/DL (ref 33.7–35.3)
MCV RBC AUTO: 99.1 FL (ref 81.4–97.8)
MCV RBC AUTO: 99.5 FL (ref 81.4–97.8)
MICRO URNS: NORMAL
MICROCYTES BLD QL SMEAR: ABNORMAL
MICROCYTES BLD QL SMEAR: ABNORMAL
MONOCYTES # BLD AUTO: 0.18 K/UL (ref 0–0.85)
MONOCYTES # BLD AUTO: 0.32 K/UL (ref 0–0.85)
MONOCYTES NFR BLD AUTO: 15 % (ref 0–13.4)
MONOCYTES NFR BLD AUTO: 8.7 % (ref 0–13.4)
MORPHOLOGY BLD-IMP: NORMAL
MORPHOLOGY BLD-IMP: NORMAL
NEUTROPHILS # BLD AUTO: 1.06 K/UL (ref 1.82–7.42)
NEUTROPHILS # BLD AUTO: 1.17 K/UL (ref 1.82–7.42)
NEUTROPHILS NFR BLD: 39.1 % (ref 44–72)
NEUTROPHILS NFR BLD: 44.3 % (ref 44–72)
NEUTS BAND NFR BLD MANUAL: 11.3 % (ref 0–10)
NEUTS BAND NFR BLD MANUAL: 11.5 % (ref 0–10)
NITRITE UR QL STRIP.AUTO: NEGATIVE
NRBC # BLD AUTO: 0 K/UL
NRBC # BLD AUTO: 0 K/UL
NRBC BLD-RTO: 0 /100 WBC
NRBC BLD-RTO: 0 /100 WBC
NT-PROBNP SERPL IA-MCNC: 92 PG/ML (ref 0–125)
OVALOCYTES BLD QL SMEAR: NORMAL
OVALOCYTES BLD QL SMEAR: NORMAL
PH UR STRIP.AUTO: 5.5 [PH] (ref 5–8)
PLATELET # BLD AUTO: 83 K/UL (ref 164–446)
PLATELET # BLD AUTO: 91 K/UL (ref 164–446)
PLATELET BLD QL SMEAR: NORMAL
PLATELET BLD QL SMEAR: NORMAL
PMV BLD AUTO: 8.9 FL (ref 9–12.9)
PMV BLD AUTO: 9.2 FL (ref 9–12.9)
POIKILOCYTOSIS BLD QL SMEAR: NORMAL
POIKILOCYTOSIS BLD QL SMEAR: NORMAL
POLYCHROMASIA BLD QL SMEAR: NORMAL
POLYCHROMASIA BLD QL SMEAR: NORMAL
POTASSIUM SERPL-SCNC: 3.6 MMOL/L (ref 3.6–5.5)
POTASSIUM SERPL-SCNC: 3.7 MMOL/L (ref 3.6–5.5)
PROCALCITONIN SERPL-MCNC: 0.13 NG/ML
PROT SERPL-MCNC: 5.3 G/DL (ref 6–8.2)
PROT SERPL-MCNC: 5.5 G/DL (ref 6–8.2)
PROT UR QL STRIP: NEGATIVE MG/DL
PROTHROMBIN TIME: 12.9 SEC (ref 12–14.6)
RBC # BLD AUTO: 3.44 M/UL (ref 4.7–6.1)
RBC # BLD AUTO: 3.71 M/UL (ref 4.7–6.1)
RBC BLD AUTO: PRESENT
RBC BLD AUTO: PRESENT
RBC UR QL AUTO: NEGATIVE
SODIUM SERPL-SCNC: 139 MMOL/L (ref 135–145)
SODIUM SERPL-SCNC: 140 MMOL/L (ref 135–145)
SP GR UR STRIP.AUTO: 1.01
T4 FREE SERPL-MCNC: 0.9 NG/DL (ref 0.53–1.43)
TSH SERPL DL<=0.005 MIU/L-ACNC: 4.28 UIU/ML (ref 0.38–5.33)
UROBILINOGEN UR STRIP.AUTO-MCNC: 0.2 MG/DL
VARIANT LYMPHS BLD QL SMEAR: NORMAL
WBC # BLD AUTO: 2.1 K/UL (ref 4.8–10.8)
WBC # BLD AUTO: 2.1 K/UL (ref 4.8–10.8)

## 2019-09-18 PROCEDURE — 84439 ASSAY OF FREE THYROXINE: CPT

## 2019-09-18 PROCEDURE — 84443 ASSAY THYROID STIM HORMONE: CPT

## 2019-09-18 PROCEDURE — 87086 URINE CULTURE/COLONY COUNT: CPT

## 2019-09-18 PROCEDURE — 96365 THER/PROPH/DIAG IV INF INIT: CPT

## 2019-09-18 PROCEDURE — 87040 BLOOD CULTURE FOR BACTERIA: CPT

## 2019-09-18 PROCEDURE — 85027 COMPLETE CBC AUTOMATED: CPT

## 2019-09-18 PROCEDURE — 700105 HCHG RX REV CODE 258: Performed by: EMERGENCY MEDICINE

## 2019-09-18 PROCEDURE — 93970 EXTREMITY STUDY: CPT | Mod: 26 | Performed by: INTERNAL MEDICINE

## 2019-09-18 PROCEDURE — 36415 COLL VENOUS BLD VENIPUNCTURE: CPT

## 2019-09-18 PROCEDURE — 81003 URINALYSIS AUTO W/O SCOPE: CPT

## 2019-09-18 PROCEDURE — 85730 THROMBOPLASTIN TIME PARTIAL: CPT

## 2019-09-18 PROCEDURE — 85007 BL SMEAR W/DIFF WBC COUNT: CPT | Mod: 91

## 2019-09-18 PROCEDURE — 80053 COMPREHEN METABOLIC PANEL: CPT

## 2019-09-18 PROCEDURE — 700111 HCHG RX REV CODE 636 W/ 250 OVERRIDE (IP): Performed by: EMERGENCY MEDICINE

## 2019-09-18 PROCEDURE — 85007 BL SMEAR W/DIFF WBC COUNT: CPT

## 2019-09-18 PROCEDURE — 83605 ASSAY OF LACTIC ACID: CPT

## 2019-09-18 PROCEDURE — 83880 ASSAY OF NATRIURETIC PEPTIDE: CPT

## 2019-09-18 PROCEDURE — 93970 EXTREMITY STUDY: CPT

## 2019-09-18 PROCEDURE — 85610 PROTHROMBIN TIME: CPT

## 2019-09-18 PROCEDURE — 85027 COMPLETE CBC AUTOMATED: CPT | Mod: 91

## 2019-09-18 PROCEDURE — 84145 PROCALCITONIN (PCT): CPT

## 2019-09-18 PROCEDURE — 71045 X-RAY EXAM CHEST 1 VIEW: CPT

## 2019-09-18 PROCEDURE — 80053 COMPREHEN METABOLIC PANEL: CPT | Mod: 91

## 2019-09-18 PROCEDURE — 99285 EMERGENCY DEPT VISIT HI MDM: CPT

## 2019-09-18 RX ADMIN — AMPICILLIN AND SULBACTAM 3 G: 2; 1 INJECTION, POWDER, FOR SOLUTION INTRAVENOUS at 23:09

## 2019-09-18 ASSESSMENT — LIFESTYLE VARIABLES
TOTAL SCORE: 0
ON A TYPICAL DAY WHEN YOU DRINK ALCOHOL HOW MANY DRINKS DO YOU HAVE: 0
EVER HAD A DRINK FIRST THING IN THE MORNING TO STEADY YOUR NERVES TO GET RID OF A HANGOVER: NO
TOTAL SCORE: 0
CONSUMPTION TOTAL: NEGATIVE
HAVE PEOPLE ANNOYED YOU BY CRITICIZING YOUR DRINKING: NO
AVERAGE NUMBER OF DAYS PER WEEK YOU HAVE A DRINK CONTAINING ALCOHOL: 0
EVER FELT BAD OR GUILTY ABOUT YOUR DRINKING: NO
TOTAL SCORE: 0
DOES PATIENT WANT TO STOP DRINKING: NO
HOW MANY TIMES IN THE PAST YEAR HAVE YOU HAD 5 OR MORE DRINKS IN A DAY: 0
DO YOU DRINK ALCOHOL: YES
HAVE YOU EVER FELT YOU SHOULD CUT DOWN ON YOUR DRINKING: NO

## 2019-09-18 NOTE — PROGRESS NOTES
Subjective:     Steve Burnham is a 64 y.o. male who presents for Edema (bilateral feet/arms, [id number] )       Edema   This is a new problem. The problem has been gradually worsening. Associated symptoms include a rash. Pertinent negatives include no chest pain or fever.     Patient presents to urgent care with concerns of bilateral lower extremity, upper extremity, and facial edema starting 10 days ago.  Also notes generalized rash.    Patient finished radiation and chemotherapy for glioma of the brain on 9/5/2019.  He has a follow-up appointment with his oncologist in 4 weeks.  Patient was on steroid therapy but has completed that. History of hypothyroidism on levothyroxine. Denies chest pain, SOB, fever, or other symptoms.    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    MEDS:   Current Outpatient Medications:   •  clobazam (ONFI) 10 MG Tab tablet, Take 5 mg by mouth., Disp: , Rfl:   •  levETIRAcetam (KEPPRA) 500 MG Tab, Take 2,000 mg by mouth., Disp: , Rfl:   •  levothyroxine (SYNTHROID) 25 MCG Tab, Take 1 Tab by mouth Every morning on an empty stomach., Disp: 90 Tab, Rfl: 3  •  apixaban (ELIQUIS) 5mg Tab, Take 1 Tab by mouth 2 Times a Day. (Patient not taking: Reported on 9/17/2019), Disp: 60 Tab, Rfl: 11  •  atorvastatin (LIPITOR) 80 MG tablet, Take 1 Tab by mouth every day. (Patient not taking: Reported on 9/17/2019), Disp: 90 Tab, Rfl: 3  •  aspirin 81 MG EC tablet, Take 1 Tab by mouth every day. (Patient not taking: Reported on 9/17/2019), Disp: 100 Tab, Rfl: 3  •  Glucosamine HCl (GLUCOSAMINE PO), Take  by mouth., Disp: , Rfl:   •  therapeutic multivitamin-minerals (THERAGRAN-M) Tab, Take 1 Tab by mouth every day., Disp: , Rfl:  "  •  polyethylene glycol 3350 (MIRALAX) Powder, Take 17 g by mouth every day., Disp: , Rfl:     ALLERGIES: No Known Allergies    SURGHX:   Past Surgical History:   Procedure Laterality Date   • INGUINAL HERNIA REPAIR Left 6/13/2018    Procedure: INGUINAL HERNIA REPAIR;  Surgeon: Eb Goyal M.D.;  Location: SURGERY [facility];  Service: General   • ROTATOR CUFF REPAIR Right 2013   • ROTATOR CUFF REPAIR Left 2008   • OTHER ABDOMINAL SURGERY  2006    R inguinal hernia   • COLONOSCOPY     • OTHER      sunny iol   • TONSILLECTOMY      at age 6 yr     SOCHX:  reports that he has never smoked. He has never used smokeless tobacco. He reports that he does not drink alcohol or use drugs.     FH: Reviewed with patient, not pertinent to this visit.    Review of Systems   Constitutional: Negative.  Negative for fever.   Respiratory: Negative.  Negative for shortness of breath.    Cardiovascular: Negative.  Negative for chest pain.   Genitourinary: Negative.    Musculoskeletal: Negative.    Skin: Positive for rash.        Edema of legs, arms, face   Neurological: Negative.    All other systems reviewed and are negative.    Objective:     /74   Pulse 83   Temp 37.3 °C (99.2 °F) (Temporal)   Resp 16   Ht 1.854 m (6' 1\")   Wt 93 kg (205 lb)   SpO2 94%   BMI 27.05 kg/m²     Physical Exam   Constitutional: He is oriented to person, place, and time. He appears well-developed and well-nourished. He is cooperative.  Non-toxic appearance. No distress.   HENT:   Head: Normocephalic.   Right Ear: External ear normal.   Left Ear: External ear normal.   Nose: Nose normal.   Mouth/Throat: Mucous membranes are normal.   Eyes: Conjunctivae and EOM are normal.   Neck: Normal range of motion.   Cardiovascular: Normal rate, regular rhythm, normal heart sounds and normal pulses.   Pulmonary/Chest: Effort normal and breath sounds normal. No respiratory distress. He has no decreased breath sounds.   Abdominal: Soft. Bowel sounds " are normal. There is no tenderness.   Musculoskeletal: Normal range of motion. He exhibits edema. He exhibits no deformity.   2+ pitting edema at bilateral lower extremities, non-pitting upper extremities, mild edema of face   Neurological: He is alert and oriented to person, place, and time. He has normal strength. No sensory deficit. Coordination normal.   Skin: Skin is warm and dry. Capillary refill takes less than 2 seconds. Rash (Generalized, diffuse erythematous macules on abdomen and bilateral lower extremities) noted. Rash is macular. He is not diaphoretic. No pallor.   Psychiatric: He has a normal mood and affect. His behavior is normal.   Vitals reviewed.       Assessment/Plan:     1. Edema, unspecified type  - TSH+FREE T4  - CBC WITH DIFFERENTIAL  - Comp Metabolic Panel; Future  - proBrain Natriuretic Peptide, NT; Future  - POCT Urinalysis    Various differentials of edema discussed.  UA pending. Labs pending.    VS stable, afebrile, NAD.    Warning signs reviewed. Return/ER precautions advised.    Patient advised to: Return for 1) Symptoms don't improve or worsen, or go to ER, 2) Follow up with primary care in 7-10 days.    Differential diagnosis, natural history, supportive care, and indications for immediate follow-up discussed. All questions answered. Patient agrees with the plan of care.    Billing note: patient billed as a new patient as the patient has not received any professional medical services from myself or another provider of the same specialty (family medicine) who belongs to the same group practice within the previous 3 years.

## 2019-09-19 VITALS
TEMPERATURE: 98.7 F | OXYGEN SATURATION: 93 % | HEIGHT: 73 IN | WEIGHT: 209.22 LBS | RESPIRATION RATE: 18 BRPM | SYSTOLIC BLOOD PRESSURE: 115 MMHG | DIASTOLIC BLOOD PRESSURE: 80 MMHG | HEART RATE: 82 BPM | BODY MASS INDEX: 27.73 KG/M2

## 2019-09-19 PROBLEM — L27.0 DRUG RASH: Status: ACTIVE | Noted: 2019-09-19

## 2019-09-19 PROBLEM — C71.9 GLIOBLASTOMA (HCC): Status: ACTIVE | Noted: 2019-09-19

## 2019-09-19 PROBLEM — D61.818 PANCYTOPENIA (HCC): Status: ACTIVE | Noted: 2019-09-19

## 2019-09-19 PROBLEM — R60.9 EDEMA: Status: ACTIVE | Noted: 2019-09-19

## 2019-09-19 PROBLEM — R21 ERYTHEMATOUS RASH: Status: ACTIVE | Noted: 2019-09-19

## 2019-09-19 LAB
ANION GAP SERPL CALC-SCNC: 9 MMOL/L (ref 0–11.9)
ANISOCYTOSIS BLD QL SMEAR: ABNORMAL
BASOPHILS # BLD AUTO: 0 % (ref 0–1.8)
BASOPHILS # BLD: 0 K/UL (ref 0–0.12)
BUN SERPL-MCNC: 9 MG/DL (ref 8–22)
CALCIUM SERPL-MCNC: 8.4 MG/DL (ref 8.5–10.5)
CHLORIDE SERPL-SCNC: 108 MMOL/L (ref 96–112)
CO2 SERPL-SCNC: 26 MMOL/L (ref 20–33)
CREAT SERPL-MCNC: 0.73 MG/DL (ref 0.5–1.4)
CRP SERPL HS-MCNC: 2.63 MG/DL (ref 0–0.75)
EOSINOPHIL # BLD AUTO: 0.08 K/UL (ref 0–0.51)
EOSINOPHIL NFR BLD: 4.3 % (ref 0–6.9)
ERYTHROCYTE [DISTWIDTH] IN BLOOD BY AUTOMATED COUNT: 63.8 FL (ref 35.9–50)
ERYTHROCYTE [SEDIMENTATION RATE] IN BLOOD BY WESTERGREN METHOD: 4 MM/HOUR (ref 0–20)
GLUCOSE SERPL-MCNC: 115 MG/DL (ref 65–99)
HCT VFR BLD AUTO: 33.3 % (ref 42–52)
HGB BLD-MCNC: 11 G/DL (ref 14–18)
LYMPHOCYTES # BLD AUTO: 0.72 K/UL (ref 1–4.8)
LYMPHOCYTES NFR BLD: 40 % (ref 22–41)
MANUAL DIFF BLD: NORMAL
MCH RBC QN AUTO: 32.8 PG (ref 27–33)
MCHC RBC AUTO-ENTMCNC: 33 G/DL (ref 33.7–35.3)
MCV RBC AUTO: 99.4 FL (ref 81.4–97.8)
MICROCYTES BLD QL SMEAR: ABNORMAL
MONOCYTES # BLD AUTO: 0.13 K/UL (ref 0–0.85)
MONOCYTES NFR BLD AUTO: 7 % (ref 0–13.4)
MORPHOLOGY BLD-IMP: NORMAL
NEUTROPHILS # BLD AUTO: 0.88 K/UL (ref 1.82–7.42)
NEUTROPHILS NFR BLD: 40 % (ref 44–72)
NEUTS BAND NFR BLD MANUAL: 8.7 % (ref 0–10)
NRBC # BLD AUTO: 0 K/UL
NRBC BLD-RTO: 0 /100 WBC
NT-PROBNP SERPL IA-MCNC: 76 PG/ML (ref 0–125)
PLATELET # BLD AUTO: 89 K/UL (ref 164–446)
PLATELET BLD QL SMEAR: NORMAL
PMV BLD AUTO: 9.4 FL (ref 9–12.9)
POIKILOCYTOSIS BLD QL SMEAR: NORMAL
POLYCHROMASIA BLD QL SMEAR: NORMAL
POTASSIUM SERPL-SCNC: 3.2 MMOL/L (ref 3.6–5.5)
RBC # BLD AUTO: 3.35 M/UL (ref 4.7–6.1)
RBC BLD AUTO: PRESENT
SODIUM SERPL-SCNC: 143 MMOL/L (ref 135–145)
WBC # BLD AUTO: 1.8 K/UL (ref 4.8–10.8)

## 2019-09-19 PROCEDURE — 770021 HCHG ROOM/CARE - ISO PRIVATE

## 2019-09-19 PROCEDURE — A9270 NON-COVERED ITEM OR SERVICE: HCPCS | Performed by: HOSPITALIST

## 2019-09-19 PROCEDURE — 85652 RBC SED RATE AUTOMATED: CPT

## 2019-09-19 PROCEDURE — 36415 COLL VENOUS BLD VENIPUNCTURE: CPT

## 2019-09-19 PROCEDURE — 85007 BL SMEAR W/DIFF WBC COUNT: CPT

## 2019-09-19 PROCEDURE — 80048 BASIC METABOLIC PNL TOTAL CA: CPT

## 2019-09-19 PROCEDURE — 85027 COMPLETE CBC AUTOMATED: CPT

## 2019-09-19 PROCEDURE — 86140 C-REACTIVE PROTEIN: CPT

## 2019-09-19 PROCEDURE — 700111 HCHG RX REV CODE 636 W/ 250 OVERRIDE (IP): Performed by: STUDENT IN AN ORGANIZED HEALTH CARE EDUCATION/TRAINING PROGRAM

## 2019-09-19 PROCEDURE — A9270 NON-COVERED ITEM OR SERVICE: HCPCS | Performed by: STUDENT IN AN ORGANIZED HEALTH CARE EDUCATION/TRAINING PROGRAM

## 2019-09-19 PROCEDURE — 83880 ASSAY OF NATRIURETIC PEPTIDE: CPT

## 2019-09-19 PROCEDURE — 99235 HOSP IP/OBS SAME DATE MOD 70: CPT | Mod: GC | Performed by: INTERNAL MEDICINE

## 2019-09-19 PROCEDURE — 700102 HCHG RX REV CODE 250 W/ 637 OVERRIDE(OP): Performed by: HOSPITALIST

## 2019-09-19 PROCEDURE — 700102 HCHG RX REV CODE 250 W/ 637 OVERRIDE(OP): Performed by: STUDENT IN AN ORGANIZED HEALTH CARE EDUCATION/TRAINING PROGRAM

## 2019-09-19 RX ORDER — ACETAMINOPHEN 325 MG/1
650 TABLET ORAL EVERY 6 HOURS PRN
Status: DISCONTINUED | OUTPATIENT
Start: 2019-09-19 | End: 2019-09-19 | Stop reason: HOSPADM

## 2019-09-19 RX ORDER — POLYETHYLENE GLYCOL 3350 17 G/17G
1 POWDER, FOR SOLUTION ORAL
Status: DISCONTINUED | OUTPATIENT
Start: 2019-09-19 | End: 2019-09-19 | Stop reason: HOSPADM

## 2019-09-19 RX ORDER — BISACODYL 10 MG
10 SUPPOSITORY, RECTAL RECTAL
Status: DISCONTINUED | OUTPATIENT
Start: 2019-09-19 | End: 2019-09-19 | Stop reason: HOSPADM

## 2019-09-19 RX ORDER — LEVETIRACETAM 250 MG/1
1000 TABLET ORAL 2 TIMES DAILY
Status: DISCONTINUED | OUTPATIENT
Start: 2019-09-19 | End: 2019-09-19 | Stop reason: HOSPADM

## 2019-09-19 RX ORDER — FUROSEMIDE 10 MG/ML
40 INJECTION INTRAMUSCULAR; INTRAVENOUS ONCE
Status: COMPLETED | OUTPATIENT
Start: 2019-09-19 | End: 2019-09-19

## 2019-09-19 RX ORDER — CLOBAZAM 10 MG/1
5 TABLET ORAL 2 TIMES DAILY
Status: DISCONTINUED | OUTPATIENT
Start: 2019-09-19 | End: 2019-09-19 | Stop reason: HOSPADM

## 2019-09-19 RX ORDER — AMOXICILLIN 250 MG
2 CAPSULE ORAL 2 TIMES DAILY
Status: DISCONTINUED | OUTPATIENT
Start: 2019-09-19 | End: 2019-09-19 | Stop reason: HOSPADM

## 2019-09-19 RX ORDER — LEVOTHYROXINE SODIUM 0.03 MG/1
25 TABLET ORAL
Status: DISCONTINUED | OUTPATIENT
Start: 2019-09-19 | End: 2019-09-19 | Stop reason: HOSPADM

## 2019-09-19 RX ADMIN — FUROSEMIDE 40 MG: 10 INJECTION, SOLUTION INTRAMUSCULAR; INTRAVENOUS at 06:28

## 2019-09-19 RX ADMIN — CLOBAZAM 5 MG: 10 TABLET ORAL at 06:24

## 2019-09-19 RX ADMIN — LEVETIRACETAM 1000 MG: 250 TABLET ORAL at 06:25

## 2019-09-19 RX ADMIN — LEVOTHYROXINE SODIUM 25 MCG: 25 TABLET ORAL at 06:25

## 2019-09-19 RX ADMIN — ENOXAPARIN SODIUM 40 MG: 100 INJECTION SUBCUTANEOUS at 06:25

## 2019-09-19 RX ADMIN — SENNOSIDES, DOCUSATE SODIUM 2 TABLET: 50; 8.6 TABLET, FILM COATED ORAL at 06:28

## 2019-09-19 ASSESSMENT — ENCOUNTER SYMPTOMS
TREMORS: 0
CONSTIPATION: 0
DEPRESSION: 0
SORE THROAT: 0
DOUBLE VISION: 0
DIARRHEA: 0
BACK PAIN: 0
HEMOPTYSIS: 0
DIZZINESS: 0
NAUSEA: 0
BLURRED VISION: 0
BRUISES/BLEEDS EASILY: 0
ORTHOPNEA: 0
PALPITATIONS: 0
COUGH: 0
SENSORY CHANGE: 0
WEIGHT LOSS: 0
HEADACHES: 0
HEARTBURN: 0
NECK PAIN: 0
CHILLS: 0
SPUTUM PRODUCTION: 0
SHORTNESS OF BREATH: 0
PHOTOPHOBIA: 0
MYALGIAS: 0
SPEECH CHANGE: 0
ABDOMINAL PAIN: 0
CLAUDICATION: 0
FEVER: 0
VOMITING: 0

## 2019-09-19 ASSESSMENT — LIFESTYLE VARIABLES: SUBSTANCE_ABUSE: 0

## 2019-09-19 ASSESSMENT — PATIENT HEALTH QUESTIONNAIRE - PHQ9
1. LITTLE INTEREST OR PLEASURE IN DOING THINGS: NOT AT ALL
SUM OF ALL RESPONSES TO PHQ9 QUESTIONS 1 AND 2: 0
2. FEELING DOWN, DEPRESSED, IRRITABLE, OR HOPELESS: NOT AT ALL

## 2019-09-19 NOTE — ASSESSMENT & PLAN NOTE
On chemoradiation, s/p cycle #1on 09/05   Follow up by the patient's oncologist in [place]   Resumed out patient medications

## 2019-09-19 NOTE — PROGRESS NOTES
2 RN skin check done.  No skin breakdown noted. Noted redness and swelling on both Lower extremities, no open wounds. Will continue to assess.

## 2019-09-19 NOTE — SENIOR ADMIT NOTE
Senior Admit Note     CC: Worsening Skin Rash     HPI:   Mr. Burnham is a very pleasant male with PMHx of Glioblastoma s/p chemotherpy and radiation (9/5/2019) who presents with worsening rash that started several days after completed chemotherapy at [facility] in [place]. Rash started in the upper extremities, sparing the palms and the spread to the torso and Lower limbs, it did start to improve on his arms/torso but the rash on his legs has continued to worsen and is associated with edema. The rash is not pruritis or painful. He denies any associated oral swelling, difficulty swallowing or breathing issues. He denies any fever or chills. He has felt fatigue but attributes that to the therapy. He denies any hx of known contact or environment allergies. Aside from the chemo he completed he denies any new medications or herbal supplements. Denies any changes in detergants or new foods in his diet.      In ED, Temp 97.3, HR 89, RR 18, /73, 95% on RA. Labs remarkable for WBC 2.1 with mild neutropenia (Abs Neut 1.06), bandemia 11.3, H/H 11.3/34.1. Plt 83, Alt 54, LA 1.3, UA negative, Pro Alhaji 0.13, CXR neg for acute process, US LE negative for DVT     Physical Exam   Constitutional: He is oriented to person, place, and time. He appears well-developed and well-nourished. No distress.   Non-toxic appearing male    HENT:   Head: Normocephalic and atraumatic.   Mouth/Throat: Oropharynx is clear and moist.   No oral/mucosal lesions    Eyes: Pupils are equal, round, and reactive to light. EOM are normal. No scleral icterus.   Neck: No JVD present.   Cardiovascular: Normal rate, regular rhythm and normal heart sounds.   No murmur heard.  Pulmonary/Chest: Effort normal and breath sounds normal. No respiratory distress. He has no rales.   Abdominal: Soft. Bowel sounds are normal. He exhibits no distension. There is no tenderness.   Musculoskeletal: Normal range of motion. He exhibits edema.   3+ pitting edema in bilateral  lower extremities    Lymphadenopathy:     He has no cervical adenopathy.   Neurological: He is alert and oriented to person, place, and time. No cranial nerve deficit.   Mild slurred speech (chronic from [place])   Skin: Skin is warm. Capillary refill takes less than 2 seconds. Rash noted. There is erythema.   Fading morbilliform rash over anterior chest and forearms, petechial like rash over feet and LE extending up to mid thigh that is non-blanching, no rash on palms or soles of feet. Overall erythematous appearance to back and face without discrete obvious rash   Psychiatric: He has a normal mood and affect.     Assessment and Plan     65 yo male with Glioblastoma and recent completion of chemoradiation presents for worsening rash that has progressed over the last 2 weeks. Rash may be secondary to drug rash, he was taking Temozolomide prior to eruption. His rash on his lower extremity may also be secondary to drug reaction however he is also thrombocytopenic which may be contributing or the cause. He does not appears to have an infectious cause at this time however given his immunosuppression will continue antibiotic therapy which was also recommended by oncology. He has no diarrheal illness making HUS less likely and denies any exposures to suggest zoonotic infection. TEN and SJS are possible but unlikely given lack of mucosa lesions, characterization of rash and its duration of presence.     - admit to medicine with protective isolation   - continue Unasyn, if fever occurs, will draw Cx and escalate Abx   - continue home synthroid/Keppra   - will give 1 dose of 40 mg IV lasix for edema, consider scheduled dosing for edema  - measures Is/Os   - no protein in urine or decrease albumin to account of edema   - holding on systemic steroid due to possibility of infectious cause   - ESR, CRP, BNP pending   - consider skin biopsy for diagnosis   - supportive care     Michelle Hodge MD

## 2019-09-19 NOTE — ED PROVIDER NOTES
ED Provider Note    Scribed for Yuki Early D.O. by Garett Palmer. 9/18/2019, 8:29 PM.    Primary care provider: Allan Jones M.D.  Means of arrival: walk in  History obtained from: Patient  History limited by: none    CHIEF COMPLAINT  Chief Complaint   Patient presents with   • Leg Swelling     Patient ambulatory to triage for bilateral leg swelling with redness x1 week. 2+ pitting edema to both legs/feet with redness, denies any injuries or trauma to area, Patient is currently being treated for brain tumor with last dose of radiation september fifth. Was seen at  yesterday and was told to come to ED due to low WBC count. Patient currently in mask.       [facility]  Steve Burnham is a 64 y.o. male who presents to the Emergency Department with worsening bilateral leg swelling and redness onset 1 week ago. The redness started on his abdomen around 9/7/19. The patient initially states that he noticed redness on his abdomen when he first started chemo.  This is since progressed to bilateral lower extremities worse on the distal legs. He was seen at urgent care for these symptoms yesterday, and was told to come to the ED due to low WBC. He denies any recent trauma or injuries to the affected areas.  He also reports experiencing intermittent mild headaches. He has treated his headaches with Tylenol with mild alleviation. He denies any fevers, leg pain, shortness of breath, hemoptysis, chest pain, back pain, vision changes, hematochezia, melena, dysuria. The patient is being treated for a glioblastoma, and finished his last radiation treatment 9/5/19. The patient goes back and forth between [place] and [place], [place]. He received his treatment in [place]. He was on Eliquis until June 2019, when he was diagnosed with his tumor.    REVIEW OF SYSTEMS  See [facility] for further details. All other systems are negative.     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    SURGICAL HISTORY   has a past surgical history that includes other; other abdominal surgery (2006); colonoscopy; rotator cuff repair (Right, 2013); rotator cuff repair (Left, 2008); tonsillectomy; and inguinal hernia repair (Left, 6/13/2018).    SOCIAL HISTORY  Social History     Tobacco Use   • Smoking status: Never Smoker   • Smokeless tobacco: Never Used   Substance Use Topics   • Alcohol use: No     Alcohol/week: 3.0 oz     Types: 5 Cans of beer per week   • Drug use: No      Social History     Substance and Sexual Activity   Drug Use No       FAMILY HISTORY  Family History   Problem Relation Age of Onset   • Hypertension Father    • Thyroid Sister    • Hypertension Brother        CURRENT MEDICATIONS  Reviewed.  See Encounter Summary.     ALLERGIES  No Known Allergies    PHYSICAL EXAM  VITAL SIGNS: /73   Pulse 89   Temp 36.3 °C (97.3 °F) (Temporal)   Resp 18   Ht 1.854 m (6' 1\")   Wt 94.9 kg (209 lb 3.5 oz)   SpO2 95%   BMI 27.60 kg/m²   Constitutional: Alert and in no apparent distress.  HENT: Normocephalic atraumatic. Bilateral external ears normal. Nose normal. Mucous membranes are moist. No mucosal involvement noted  Eyes: Pupils are equal and reactive. Conjunctiva normal. No conjunctival involvement noted. Non-icteric sclera.   Neck: Normal range of motion without tenderness. Supple. No meningeal signs.  Cardiovascular: Regular rate and rhythm. No murmurs, gallops or rubs.  Thorax & Lungs: Breath sounds are clear to auscultation bilaterally. No wheezing, rhonchi or rales.  Abdomen: Soft, nontender and nondistended. No peritoneal signs noted.  Skin: Warm and dry.Petechial rash on bilateral lower extremity from knee distally with sparing of the feet. Erythematous rash on " bilateral thighs, which is blanchable  Back: No bony tenderness, No CVA tenderness.   Extremities: 2+ peripheral pulses. Cap refill is less than 2 seconds. No cyanosis or clubbing. 1+ pitting edema in bilateral lower extremities.  Musculoskeletal: Good range of motion in all major joints. No tenderness to palpation or major deformities noted.   Neurologic: Alert and oriented ×3. The patient moves all 4 extremities and follows commands.  Psychiatric: Affect is normal. Judgment appears to be intact.    DIAGNOSTIC STUDIES / PROCEDURES     LABS  Results for orders placed or performed during the hospital encounter of 09/18/19   Lactic acid (lactate)   Result Value Ref Range    Lactic Acid 1.3 0.5 - 2.0 mmol/L   Lactic acid (lactate): Repeat if initial lactic acid result is greater than 2   Result Value Ref Range    Lactic Acid 1.3 0.5 - 2.0 mmol/L   CBC WITH DIFFERENTIAL   Result Value Ref Range    WBC 2.1 (LL) 4.8 - 10.8 K/uL    RBC 3.44 (L) 4.70 - 6.10 M/uL    Hemoglobin 11.3 (L) 14.0 - 18.0 g/dL    Hematocrit 34.1 (L) 42.0 - 52.0 %    MCV 99.1 (H) 81.4 - 97.8 fL    MCH 32.8 27.0 - 33.0 pg    MCHC 33.1 (L) 33.7 - 35.3 g/dL    RDW 63.3 (H) 35.9 - 50.0 fL    Platelet Count 83 (L) 164 - 446 K/uL    MPV 8.9 (L) 9.0 - 12.9 fL    Neutrophils-Polys 39.10 (L) 44.00 - 72.00 %    Lymphocytes 38.30 22.00 - 41.00 %    Monocytes 8.70 0.00 - 13.40 %    Eosinophils 1.70 0.00 - 6.90 %    Basophils 0.90 0.00 - 1.80 %    Nucleated RBC 0.00 /100 WBC    Neutrophils (Absolute) 1.06 (L) 1.82 - 7.42 K/uL    Lymphs (Absolute) 0.80 (L) 1.00 - 4.80 K/uL    Monos (Absolute) 0.18 0.00 - 0.85 K/uL    Eos (Absolute) 0.04 0.00 - 0.51 K/uL    Baso (Absolute) 0.02 0.00 - 0.12 K/uL    NRBC (Absolute) 0.00 K/uL    Anisocytosis 1+     Microcytosis 1+    COMP METABOLIC PANEL   Result Value Ref Range    Sodium 140 135 - 145 mmol/L    Potassium 3.6 3.6 - 5.5 mmol/L    Chloride 105 96 - 112 mmol/L    Co2 27 20 - 33 mmol/L    Anion Gap 8.0 0.0 - 11.9     Glucose 100 (H) 65 - 99 mg/dL    Bun 12 8 - 22 mg/dL    Creatinine 0.78 0.50 - 1.40 mg/dL    Calcium 8.6 8.5 - 10.5 mg/dL    AST(SGOT) 30 12 - 45 U/L    ALT(SGPT) 54 (H) 2 - 50 U/L    Alkaline Phosphatase 45 30 - 99 U/L    Total Bilirubin 0.6 0.1 - 1.5 mg/dL    Albumin 3.8 3.2 - 4.9 g/dL    Total Protein 5.5 (L) 6.0 - 8.2 g/dL    Globulin 1.7 (L) 1.9 - 3.5 g/dL    A-G Ratio 2.2 g/dL   URINALYSIS   Result Value Ref Range    Color Yellow     Character Clear     Specific Gravity 1.009 <1.035    Ph 5.5 5.0 - 8.0    Glucose Negative Negative mg/dL    Ketones Negative Negative mg/dL    Protein Negative Negative mg/dL    Bilirubin Negative Negative    Urobilinogen, Urine 0.2 Negative    Nitrite Negative Negative    Leukocyte Esterase Negative Negative    Occult Blood Negative Negative    Micro Urine Req see below    ESTIMATED GFR   Result Value Ref Range    GFR If African American >60 >60 mL/min/1.73 m 2    GFR If Non African American >60 >60 mL/min/1.73 m 2   PERIPHERAL SMEAR REVIEW   Result Value Ref Range    Peripheral Smear Review see below    PLATELET ESTIMATE   Result Value Ref Range    Plt Estimation Decreased    MORPHOLOGY   Result Value Ref Range    RBC Morphology Present     Polychromia 1+     Poikilocytosis 1+     Ovalocytes 1+    DIFFERENTIAL MANUAL   Result Value Ref Range    Bands-Stabs 11.30 (H) 0.00 - 10.00 %    Manual Diff Status PERFORMED    PROCALCITONIN   Result Value Ref Range    Procalcitonin 0.13 <0.25 ng/mL   PROTHROMBIN TIME (INR)   Result Value Ref Range    PT 12.9 12.0 - 14.6 sec    INR 0.95 0.87 - 1.13   APTT   Result Value Ref Range    APTT 33.3 24.7 - 36.0 sec       All labs were reviewed by me.    RADIOLOGY  DX-CHEST-PORTABLE (1 VIEW)   Final Result      No acute cardiac or pulmonary abnormalities are identified.      US-EXTREMITY VENOUS LOWER BILAT    (Results Pending)     The radiologist's interpretation of all radiological studies have been reviewed by me.    COURSE & MEDICAL DECISION  MAKING  Pertinent Labs & Imaging studies reviewed. (See chart for details)    8:45 PM - Patient seen and examined at bedside. Ordered US-extremity venous lower bilateral, prothrombin time (INR), APTT, procalcitonin to evaluate his symptoms. I discussed the plan of care, including the plan for admission. Patient verbalizes understanding and agreement to this plan of care.     9:50 PM - Paged for Oncology at this time.    9:53 PM - I discussed the patient's case and the above findings with Dr. Slaughter (Oncologist) who agreed with the plan for antibiotics and admission.      9:58 PM - Ordered for Unasyn 3 g in  mL IVPB for the patient's symptoms.    11:37 PM - Patient was reevaluated at bedside. Discussed lab and radiology results with the patient and updated them on the plan of care, including the plan for admission. I answered all the patient's questions regarding his care. Patient verbalizes understanding and agreement to this plan of care.      11:54 PM - Spoke with Dr. Hodge, [facility], about the patient's condition. She agrees to admit the patient.    Decision Making:  This is a 64 y.o. year old male who presents with a rash.  On initial evaluation, the patient appeared well and in no acute distress.  His vital signs were normal.  He was noted to have a significant rash from his abdomen to bilateral lower extremities.  The rash from his abdomen to his knees was erythematous and blanchable; however, he had non-blanchable petechia over bilateral lower extremities.  No calf tenderness but was noted to have some edema.  Preliminary read of the ultrasound of the bilateral lower extremities was negative for DVT.  His white blood cell count was low at 2.1 but he had a bandemia of 11.  Given the progression of the erythema and bandemia, the patient was started on antibiotics for presumed cellulitis.  Despite the petechia, his platelets were reassuring at 83.  His H&H were also reassuring.  Metabolic  panel was normal.  The patient was admitted to the [facility] internal medicine service for further evaluation and close monitoring of the rash as well as his neutropenia.  He remained stable while in the emergency department and he and his wife are agreeable with the plan.    DISPOSITION:  Patient will be admitted to Dr. Hodge in guarded condition.    FINAL IMPRESSION  1. Rash    2. Neutropenia, unspecified type (HCC)          Garett ACEVEDO (Pope[place]), am scribing for, and in the presence of, Yuki Early D.O..    Electronically signed by: Garett Palmer (Popeye), 9/18/2019    Yuki ACEVEDO D.O. personally performed the services described in this documentation, as scribed by Garett Palmer in my presence, and it is both accurate and complete. C    The note accurately reflects work and decisions made by me.  Yuki Early  9/19/2019  12:25 AM

## 2019-09-19 NOTE — NON-PROVIDER
Internal Medicine Interval Note  Note Author: Blank Li, Student     Name Steve Burnham     1955   Age/Sex 64 y.o. male   MRN [id number]   Code Status Full Code      After 5PM or if no immediate response to page, please call for cross-coverage  Attending/Team: Kate/Jim See Patient List for primary contact information  Call [phone] to page    1st Call - Day Intern (R1):   Dr. Scott 2nd Call - Day Sr. Resident (R2/R3):   Dr. Benedict         Reason for interval visit  (Principal Problem)   Diffuse B/L lower swelling, diffuse erythematous rash    Interval Problem Daily Status Update  (24 hours)   Mr. Burnham is a 63 yo male with a PMH of GBS, hypothyroidism, ASD, atherosclerosis of carotid arteries, thoracic aortic aneurysm, and cryptogenic stroke presented to the ED on  for diffuse erythematous rash and B/L pedal edema. No acute overnight events. Patient states that edema in his legs look better today. Rash is about the same.     Pancytopenia: WBC dropped to 1.8 today. Bands 8.7 today yesterday was 11.3. Blood culture was negative for infection.    Review of Systems   Constitutional: Negative for chills and fever.   HENT: Negative for congestion.    Respiratory: Negative for cough and shortness of breath.    Cardiovascular: Positive for leg swelling. Negative for chest pain and palpitations.   Gastrointestinal: Negative for abdominal pain, constipation, diarrhea, nausea and vomiting.   Skin: Positive for rash (Worse bilaterally on LE. Rash on arms and abd have subsided). Negative for itching.   Endo/Heme/Allergies: Does not bruise/bleed easily.     Quality Measures  Quality-Core Measures   Reviewed items::  Labs reviewed and Radiology images reviewed  Becerra catheter::  No Becerra  DVT prophylaxis pharmacological::  Enoxaparin (Lovenox)      Physical Exam       Vitals:    19 0100 19 0149 19 0629 19 0800   BP: 119/84 136/83 110/74 115/80   Pulse: 81 85  82    Resp:  17  18   Temp:  36.9 °C (98.5 °F)  37.1 °C (98.7 °F)   TempSrc:  Temporal  Temporal   SpO2: 96% 94%  93%   Weight:       Height:         Body mass index is 27.6 kg/m². Weight: 94.9 kg (209 lb 3.5 oz)  Oxygen Therapy:  Pulse Oximetry: 93 %, O2 (LPM): 0, O2 Delivery: None (Room Air)    Physical Exam   Constitutional:   Pleasant M, NAD   Cardiovascular: Normal rate, regular rhythm and intact distal pulses. Exam reveals no gallop and no friction rub.   No murmur heard.  Pulmonary/Chest: Breath sounds normal. No respiratory distress. He has no wheezes. He has no rales.   Abdominal: Soft. Bowel sounds are normal. He exhibits no distension. There is no tenderness. There is no rebound.   Musculoskeletal: He exhibits edema (3+ pitting edema on feet bilaterally). He exhibits no tenderness.   Skin: Skin is warm.   Erythematous non-pruritic, non- blanchable rash on b/l lower extremities extending till knees.      Lab Data Review:     9/19/2019  12:30 PM    Recent Labs     09/18/19 0737 09/18/19 1953 09/19/19  0445   SODIUM 139 140 143   POTASSIUM 3.7 3.6 3.2*   CHLORIDE 106 105 108   CO2 26 27 26   BUN 13 12 9   CREATININE 0.80 0.78 0.73   CALCIUM 8.5 8.6 8.4*       Recent Labs     09/18/19 0737 09/18/19 1953 09/19/19  0445   ALTSGPT 49 54*  --    ASTSGOT 27 30  --    ALKPHOSPHAT 42 45  --    TBILIRUBIN 0.6 0.6  --    GLUCOSE 92 100* 115*       Recent Labs     09/18/19 0737 09/18/19 1953 09/19/19  0445   RBC 3.71* 3.44* 3.35*   HEMOGLOBIN 12.4* 11.3* 11.0*   HEMATOCRIT 36.9* 34.1* 33.3*   PLATELETCT 91* 83* 89*   PROTHROMBTM  --  12.9  --    APTT  --  33.3  --    INR  --  0.95  --        Recent Labs     09/18/19 0737 09/18/19 1953 09/19/19  0445   WBC 2.1* 2.1* 1.8*   NEUTSPOLYS 44.30 39.10* 40.00*   LYMPHOCYTES 25.70 38.30 40.00   MONOCYTES 15.00* 8.70 7.00   EOSINOPHILS 3.50 1.70 4.30   BASOPHILS 0.00 0.90 0.00   ASTSGOT 27 30  --    ALTSGPT 49 54*  --    ALKPHOSPHAT 42 45  --    TBILIRUBIN 0.6 0.6  --         Assessment/Plan     Erythematous rash  Assessment & Plan  Diffuse non pruritic, non blanchable, erythematous rash associated with b/l lower limb edema, anemia, leukopenia and thrombocytopenia. Appears to be a drug eruption rash secondary to bactrim use which was used ppx. Edema could be secondary to chemoradiation therapy. U/S ruled out DVT  -patient will be d/c today  -F/U with oncologist for alternatives to bactrim    Pancytopenia (HCC)  Assessment & Plan  Anemia, leukopenia or pancytopenia likely secondary to chemoradiation. Patient is afebrile and does not appear to be acute ill. Bands normalized at 8.3 today.   -F/U up with PCP- CBC in one week

## 2019-09-19 NOTE — DISCHARGE INSTRUCTIONS
Discharge Instructions    Discharged to home by car with relative. Discharged via wheelchair, hospital escort: Yes.  Special equipment needed: Not Applicable    Be sure to schedule a follow-up appointment with your primary care doctor or any specialists as instructed.     Discharge Plan:   Diet Plan: Discussed  Activity Level: Discussed  Confirmed Follow up Appointment: Patient to Call and Schedule Appointment  Confirmed Symptoms Management: Discussed  Medication Reconciliation Updated: Yes    I understand that a diet low in cholesterol, fat, and sodium is recommended for good health. Unless I have been given specific instructions below for another diet, I accept this instruction as my diet prescription.   Other diet: Regular heart healthy diet    Special Instructions: None    · Is patient discharged on Warfarin / Coumadin?   No     Depression / Suicide Risk    As you are discharged from this [facility] facility, it is important to learn how to keep safe from harming yourself.    Recognize the warning signs:  · Abrupt changes in personality, positive or negative- including increase in energy   · Giving away possessions  · Change in eating patterns- significant weight changes-  positive or negative  · Change in sleeping patterns- unable to sleep or sleeping all the time   · Unwillingness or inability to communicate  · Depression  · Unusual sadness, discouragement and loneliness  · Talk of wanting to die  · Neglect of personal appearance   · Rebelliousness- reckless behavior  · Withdrawal from people/activities they love  · Confusion- inability to concentrate     If you or a loved one observes any of these behaviors or has concerns about self-harm, here's what you can do:  · Talk about it- your feelings and reasons for harming yourself  · Remove any means that you might use to hurt yourself (examples: pills, rope, extension cords, firearm)  · Get professional help from the community (Mental Health, Substance Abuse,  psychological counseling)  · Do not be alone:Call your Safe Contact- someone whom you trust who will be there for you.  · Call your local CRISIS HOTLINE 509-3795 or 438-970-4094  · Call your local Children's Mobile Crisis Response Team [place] (768) 258-2411 or www.[employer]  · Call the toll free National Suicide Prevention Hotlines   · National Suicide Prevention Lifeline 883-828-TFPI (1007)  · National Hope Line Network 800-SUICIDE (970-5358)

## 2019-09-19 NOTE — CARE PLAN
Problem: Communication  Goal: The ability to communicate needs accurately and effectively will improve  Outcome: MET     Problem: Safety  Goal: Will remain free from injury  Outcome: MET  Goal: Will remain free from falls  Outcome: MET     Problem: Infection  Goal: Will remain free from infection  Outcome: MET     Problem: Venous Thromboembolism (VTW)/Deep Vein Thrombosis (DVT) Prevention:  Goal: Patient will participate in Venous Thrombosis (VTE)/Deep Vein Thrombosis (DVT)Prevention Measures  Outcome: MET     Problem: Bowel/Gastric:  Goal: Normal bowel function is maintained or improved  Outcome: MET  Goal: Will not experience complications related to bowel motility  Outcome: MET     Problem: Knowledge Deficit  Goal: Knowledge of disease process/condition, treatment plan, diagnostic tests, and medications will improve  Outcome: MET  Goal: Knowledge of the prescribed therapeutic regimen will improve  Outcome: MET     Problem: Discharge Barriers/Planning  Goal: Patient's continuum of care needs will be met  Outcome: MET     Problem: Fluid Volume:  Goal: Will maintain balanced intake and output  Outcome: MET

## 2019-09-19 NOTE — TELEPHONE ENCOUNTER
Phoned patient's contact number.  Spoke with wife regarding results.  Advised of low white count and increased susceptibility to infection.  Advised to monitor for signs of infection.  No fever at this time.    Recommend follow-up with oncology.  Wife reports that records are being forwarded to patient's oncologist.    Wife does endorse that patient's leg swelling and redness has been getting worse.  Recommend evaluation in the ED.

## 2019-09-19 NOTE — PROGRESS NOTES
Patient has been discharged to home transported by his wife. Patient refused hospital escort and wheelchair, patient has walked off of the unit. AVS was reviewed with the patient and has been signed. PIV removed. Patient has been instructed to follow up with his PCP in 1 week. All questions have been answered.

## 2019-09-19 NOTE — ED TRIAGE NOTES
"Chief Complaint   Patient presents with   • Leg Swelling     Patient ambulatory to triage for bilateral leg swelling with redness x1 week. 2+ pitting edema to both legs/feet with redness, denies any injuries or trauma to area, Patient is currently being treated for brain tumor with last dose of radiation september fifth. Was seen at  yesterday and was told to come to ED due to low WBC count. Patient currently in mask.     /73   Pulse 89   Temp 36.3 °C (97.3 °F) (Temporal)   Resp 18   Ht 1.854 m (6' 1\")   Wt 94.9 kg (209 lb 3.5 oz)   SpO2 95%     Charge RN notified of patient, patient placed in [facility], apologized for wait times, instructed to notify staff of any new or worsening symptoms.   "

## 2019-09-19 NOTE — H&P
Internal Medicine Admitting History and Physical    Note Author: Jose Drew M.D.       Name Steve Burnham     1955   Age/Sex 64 y.o. male   MRN [id number]   Code Status Full code     After 5PM or if no immediate response to page, please call for cross-coverage  Attending/Team: Dr. Love/Joshua See Patient List for primary contact information  Call [phone] to page    1st Call - Day Intern (R1):   Dr. Scott 2nd Call - Day Sr. Resident (R2/R3):   Dr. Benedict       Chief Complaint:   B/L lower extremity swelling, diffuse erythematous rash.     HPI:  Mr. Burnham is a 64 yr old gentlemen with known history of Glioblastoma multiforme on chemoradiation at [facility], [place], [place], ASD, atherosclerosis of carotid arteries, thoracic aortic aneurysm, hyperlipidemia, hypothyroidism, cryptogenic stroke, TIA came with c/o diffuse erythematous rash all over the body(abdomen, back, hands, and b/l lower limbs) which was sudden in onset, started a week ago, progressive, non itchy. Rash is associated with b/l lower extremity non pitting edema extending to shin. Non tender, not associated with warmth, no discharge noted. He mentioned the rash on his hands and abdomen is subsiding but worsening in his legs.  He denied any c/o of nausea, vomiting, headache, dizziness, chest pain, shortness of breath, abdominal pain, diarrhea, constipation, dysuria, or burning micturition. He denied any recent travel, camping, or hiking. He travels between [place] and [place] for his treatment and got his recent treatment cycle on 2019.    ED course:  Vitals-BP-108/73, spo2-95 on RA, pulse-89, temp-97.3, RR-18/min  Labs- WBC-2.1, ANC-1060, Hb-11.3, platelets-83, rest of the labs are unremarkable.  CXR- No acute cardiac or pulmonary abnormalities.  US doppler- No superficial or deep vein thrombosis.  Pt received IV Unasyn in the ED.    Review of Systems   Constitutional: Negative for chills, fever and weight loss.    HENT: Negative for ear discharge, ear pain, hearing loss, sore throat and tinnitus.    Eyes: Negative for blurred vision, double vision and photophobia.   Respiratory: Negative for cough, hemoptysis, sputum production and shortness of breath.    Cardiovascular: Positive for leg swelling. Negative for chest pain, palpitations, orthopnea and claudication.   Gastrointestinal: Negative for abdominal pain, constipation, diarrhea, heartburn, nausea and vomiting.   Genitourinary: Negative for dysuria, frequency and urgency.   Musculoskeletal: Negative for back pain, myalgias and neck pain.   Skin: Positive for rash.   Neurological: Negative for dizziness, tremors, sensory change, speech change and headaches.   Endo/Heme/Allergies: Negative for environmental allergies. Does not bruise/bleed easily.   Psychiatric/Behavioral: Negative for depression, substance abuse and suicidal ideas.             Past Medical History (Chronic medical problem, known complications and current treatment)    Past Medical History:   Diagnosis Date   • ASD (atrial septal defect) 1/21/2019   • Cataract     bilat IOL   • Chronic constipation 1/29/2016   • Disorder of thyroid    • Hand arthritis 8/31/2015   • Left inguinal pain 1/17/2018   • Routine general medical examination at a health care facility 1/29/2016 1/29/16   • TIA due to embolism (HCC) 1/21/2019          Past Surgical History:  Past Surgical History:   Procedure Laterality Date   • INGUINAL HERNIA REPAIR Left 6/13/2018    Procedure: INGUINAL HERNIA REPAIR;  Surgeon: Eb Goyal M.D.;  Location: SURGERY [facility];  Service: General   • ROTATOR CUFF REPAIR Right 2013   • ROTATOR CUFF REPAIR Left 2008   • OTHER ABDOMINAL SURGERY  2006    R inguinal hernia   • COLONOSCOPY     • OTHER      sunny iol   • TONSILLECTOMY      at age 6 yr       Current Outpatient Medications:  Home Medications     Reviewed by Ellen Guerra RDruNDru (Registered Nurse) on 09/19/19 at 0249  Med  List Status: Complete   Medication Last Dose Status   cloBAZam 10 MG FILM 9/18/2019 Active   levETIRAcetam (KEPPRA) 500 MG Tab 9/18/2019 Active   levothyroxine (SYNTHROID) 25 MCG Tab 9/18/2019 Active   polyethylene glycol 3350 (MIRALAX) Powder 9/18/2019 Active   therapeutic multivitamin-minerals (THERAGRAN-M) Tab 9/18/2019 Active                Medication Allergy/Sensitivities:  No Known Allergies      Family History (mandatory)   Family History   Problem Relation Age of Onset   • Hypertension Father    • Thyroid Sister    • Hypertension Brother        Social History (mandatory)   Social History     Socioeconomic History   • Marital status:      Spouse name: Not on file   • Number of children: Not on file   • Years of education: Not on file   • Highest education level: Not on file   Occupational History   • Not on file   Social Needs   • Financial resource strain: Not on file   • Food insecurity:     Worry: Not on file     Inability: Not on file   • Transportation needs:     Medical: Not on file     Non-medical: Not on file   Tobacco Use   • Smoking status: Never Smoker   • Smokeless tobacco: Never Used   Substance and Sexual Activity   • Alcohol use: No     Alcohol/week: 3.0 oz     Types: 5 Cans of beer per week   • Drug use: No   • Sexual activity: Not on file   Lifestyle   • Physical activity:     Days per week: Not on file     Minutes per session: Not on file   • Stress: Not on file   Relationships   • Social connections:     Talks on phone: Not on file     Gets together: Not on file     Attends [employer] service: Not on file     Active member of club or organization: Not on file     Attends meetings of clubs or organizations: Not on file     Relationship status: Not on file   • Intimate partner violence:     Fear of current or ex partner: Not on file     Emotionally abused: Not on file     Physically abused: Not on file     Forced sexual activity: Not on file   Other Topics Concern   • Not on file    Social History Narrative   • Not on file     Living situation:   PCP : Allan Jones M.D.    Physical Exam     Vitals:    09/19/19 0006 09/19/19 0030 09/19/19 0100 09/19/19 0149   BP: 108/57 100/55 119/84 136/83   Pulse: 79 78 81 85   Resp:    17   Temp:    36.9 °C (98.5 °F)   TempSrc:    Temporal   SpO2: 96% 97% 96% 94%   Weight:       Height:         Body mass index is 27.6 kg/m².  O2 therapy: Pulse Oximetry: 94 %, O2 (LPM): 0, O2 Delivery: None (Room Air)    Physical Exam   Constitutional: He is oriented to person, place, and time and well-developed, well-nourished, and in no distress. No distress.   HENT:   Head: Normocephalic and atraumatic.   Right Ear: External ear normal.   Left Ear: External ear normal.   Eyes: Pupils are equal, round, and reactive to light. Conjunctivae and EOM are normal. No scleral icterus.   Neck: Normal range of motion. Neck supple. No JVD present. No tracheal deviation present. No thyromegaly present.   Cardiovascular: Normal rate, regular rhythm and normal heart sounds.   Pulmonary/Chest: Effort normal and breath sounds normal. No respiratory distress. He has no wheezes.   Abdominal: Soft. Bowel sounds are normal. He exhibits no distension. There is no tenderness. There is no rebound.   Erythematous rash noticed on abdomen   Musculoskeletal: Normal range of motion. He exhibits edema.   B/L lower extremities   Neurological: He is alert and oriented to person, place, and time. Gait normal. GCS score is 15.   Skin: Skin is dry. He is not diaphoretic. There is erythema.   Erythematous non-pruritic, non- blanchable rash on b/l lower extremities extending till knees.   Psychiatric: Memory, affect and judgment normal.         Data Review       Old Records Request:   Completed  Current Records review/summary: Completed    Lab Data Review:  Recent Results (from the past 24 hour(s))   proBrain Natriuretic Peptide, NT    Collection Time: 09/18/19  7:37 AM   Result Value Ref Range     NT-proBNP 92 0 - 125 pg/mL   Comp Metabolic Panel    Collection Time: 09/18/19  7:37 AM   Result Value Ref Range    Sodium 139 135 - 145 mmol/L    Potassium 3.7 3.6 - 5.5 mmol/L    Chloride 106 96 - 112 mmol/L    Co2 26 20 - 33 mmol/L    Anion Gap 7.0 0.0 - 11.9    Glucose 92 65 - 99 mg/dL    Bun 13 8 - 22 mg/dL    Creatinine 0.80 0.50 - 1.40 mg/dL    Calcium 8.5 8.5 - 10.5 mg/dL    AST(SGOT) 27 12 - 45 U/L    ALT(SGPT) 49 2 - 50 U/L    Alkaline Phosphatase 42 30 - 99 U/L    Total Bilirubin 0.6 0.1 - 1.5 mg/dL    Albumin 3.3 3.2 - 4.9 g/dL    Total Protein 5.3 (L) 6.0 - 8.2 g/dL    Globulin 2.0 1.9 - 3.5 g/dL    A-G Ratio 1.7 g/dL   CBC WITH DIFFERENTIAL    Collection Time: 09/18/19  7:37 AM   Result Value Ref Range    WBC 2.1 (LL) 4.8 - 10.8 K/uL    RBC 3.71 (L) 4.70 - 6.10 M/uL    Hemoglobin 12.4 (L) 14.0 - 18.0 g/dL    Hematocrit 36.9 (L) 42.0 - 52.0 %    MCV 99.5 (H) 81.4 - 97.8 fL    MCH 33.4 (H) 27.0 - 33.0 pg    MCHC 33.6 (L) 33.7 - 35.3 g/dL    RDW 64.0 (H) 35.9 - 50.0 fL    Platelet Count 91 (L) 164 - 446 K/uL    MPV 9.2 9.0 - 12.9 fL    Neutrophils-Polys 44.30 44.00 - 72.00 %    Lymphocytes 25.70 22.00 - 41.00 %    Monocytes 15.00 (H) 0.00 - 13.40 %    Eosinophils 3.50 0.00 - 6.90 %    Basophils 0.00 0.00 - 1.80 %    Nucleated RBC 0.00 /100 WBC    Neutrophils (Absolute) 1.17 (L) 1.82 - 7.42 K/uL    Lymphs (Absolute) 0.54 (L) 1.00 - 4.80 K/uL    Monos (Absolute) 0.32 0.00 - 0.85 K/uL    Eos (Absolute) 0.07 0.00 - 0.51 K/uL    Baso (Absolute) 0.00 0.00 - 0.12 K/uL    NRBC (Absolute) 0.00 K/uL    Anisocytosis 1+     Microcytosis 1+    TSH    Collection Time: 09/18/19  7:37 AM   Result Value Ref Range    TSH 4.280 0.380 - 5.330 uIU/mL   FREE THYROXINE    Collection Time: 09/18/19  7:37 AM   Result Value Ref Range    Free T-4 0.90 0.53 - 1.43 ng/dL   ESTIMATED GFR    Collection Time: 09/18/19  7:37 AM   Result Value Ref Range    GFR If African American >60 >60 mL/min/1.73 m 2    GFR If Non African American >60  >60 mL/min/1.73 m 2   DIFFERENTIAL MANUAL    Collection Time: 09/18/19  7:37 AM   Result Value Ref Range    Bands-Stabs 11.50 (H) 0.00 - 10.00 %    Manual Diff Status PERFORMED    PERIPHERAL SMEAR REVIEW    Collection Time: 09/18/19  7:37 AM   Result Value Ref Range    Peripheral Smear Review see below    PLATELET ESTIMATE    Collection Time: 09/18/19  7:37 AM   Result Value Ref Range    Plt Estimation Decreased    MORPHOLOGY    Collection Time: 09/18/19  7:37 AM   Result Value Ref Range    RBC Morphology Present     Large Platelets 1+     Polychromia 1+     Poikilocytosis 2+     Ovalocytes 2+     Reactive Lymphocytes Few    Lactic acid (lactate)    Collection Time: 09/18/19  7:53 PM   Result Value Ref Range    Lactic Acid 1.3 0.5 - 2.0 mmol/L   CBC WITH DIFFERENTIAL    Collection Time: 09/18/19  7:53 PM   Result Value Ref Range    WBC 2.1 (LL) 4.8 - 10.8 K/uL    RBC 3.44 (L) 4.70 - 6.10 M/uL    Hemoglobin 11.3 (L) 14.0 - 18.0 g/dL    Hematocrit 34.1 (L) 42.0 - 52.0 %    MCV 99.1 (H) 81.4 - 97.8 fL    MCH 32.8 27.0 - 33.0 pg    MCHC 33.1 (L) 33.7 - 35.3 g/dL    RDW 63.3 (H) 35.9 - 50.0 fL    Platelet Count 83 (L) 164 - 446 K/uL    MPV 8.9 (L) 9.0 - 12.9 fL    Neutrophils-Polys 39.10 (L) 44.00 - 72.00 %    Lymphocytes 38.30 22.00 - 41.00 %    Monocytes 8.70 0.00 - 13.40 %    Eosinophils 1.70 0.00 - 6.90 %    Basophils 0.90 0.00 - 1.80 %    Nucleated RBC 0.00 /100 WBC    Neutrophils (Absolute) 1.06 (L) 1.82 - 7.42 K/uL    Lymphs (Absolute) 0.80 (L) 1.00 - 4.80 K/uL    Monos (Absolute) 0.18 0.00 - 0.85 K/uL    Eos (Absolute) 0.04 0.00 - 0.51 K/uL    Baso (Absolute) 0.02 0.00 - 0.12 K/uL    NRBC (Absolute) 0.00 K/uL    Anisocytosis 1+     Microcytosis 1+    COMP METABOLIC PANEL    Collection Time: 09/18/19  7:53 PM   Result Value Ref Range    Sodium 140 135 - 145 mmol/L    Potassium 3.6 3.6 - 5.5 mmol/L    Chloride 105 96 - 112 mmol/L    Co2 27 20 - 33 mmol/L    Anion Gap 8.0 0.0 - 11.9    Glucose 100 (H) 65 - 99 mg/dL     Bun 12 8 - 22 mg/dL    Creatinine 0.78 0.50 - 1.40 mg/dL    Calcium 8.6 8.5 - 10.5 mg/dL    AST(SGOT) 30 12 - 45 U/L    ALT(SGPT) 54 (H) 2 - 50 U/L    Alkaline Phosphatase 45 30 - 99 U/L    Total Bilirubin 0.6 0.1 - 1.5 mg/dL    Albumin 3.8 3.2 - 4.9 g/dL    Total Protein 5.5 (L) 6.0 - 8.2 g/dL    Globulin 1.7 (L) 1.9 - 3.5 g/dL    A-G Ratio 2.2 g/dL   ESTIMATED GFR    Collection Time: 09/18/19  7:53 PM   Result Value Ref Range    GFR If African American >60 >60 mL/min/1.73 m 2    GFR If Non African American >60 >60 mL/min/1.73 m 2   PERIPHERAL SMEAR REVIEW    Collection Time: 09/18/19  7:53 PM   Result Value Ref Range    Peripheral Smear Review see below    PLATELET ESTIMATE    Collection Time: 09/18/19  7:53 PM   Result Value Ref Range    Plt Estimation Decreased    MORPHOLOGY    Collection Time: 09/18/19  7:53 PM   Result Value Ref Range    RBC Morphology Present     Polychromia 1+     Poikilocytosis 1+     Ovalocytes 1+    DIFFERENTIAL MANUAL    Collection Time: 09/18/19  7:53 PM   Result Value Ref Range    Bands-Stabs 11.30 (H) 0.00 - 10.00 %    Manual Diff Status PERFORMED    PROCALCITONIN    Collection Time: 09/18/19  7:53 PM   Result Value Ref Range    Procalcitonin 0.13 <0.25 ng/mL   PROTHROMBIN TIME (INR)    Collection Time: 09/18/19  7:53 PM   Result Value Ref Range    PT 12.9 12.0 - 14.6 sec    INR 0.95 0.87 - 1.13   APTT    Collection Time: 09/18/19  7:53 PM   Result Value Ref Range    APTT 33.3 24.7 - 36.0 sec   URINALYSIS    Collection Time: 09/18/19  9:44 PM   Result Value Ref Range    Color Yellow     Character Clear     Specific Gravity 1.009 <1.035    Ph 5.5 5.0 - 8.0    Glucose Negative Negative mg/dL    Ketones Negative Negative mg/dL    Protein Negative Negative mg/dL    Bilirubin Negative Negative    Urobilinogen, Urine 0.2 Negative    Nitrite Negative Negative    Leukocyte Esterase Negative Negative    Occult Blood Negative Negative    Micro Urine Req see below    Lactic acid (lactate):  Repeat if initial lactic acid result is greater than 2    Collection Time: 09/18/19 11:02 PM   Result Value Ref Range    Lactic Acid 1.3 0.5 - 2.0 mmol/L       Imaging/Procedures Review:    Independant Imaging Review: Completed  US-EXTREMITY VENOUS LOWER BILAT         DX-CHEST-PORTABLE (1 VIEW)   Final Result      No acute cardiac or pulmonary abnormalities are identified.               EKG:   EKG Independent Review: Completed  QTc:, HR: , Normal Sinus Rhythm, no ST/T changes     Records reviewed and summarized in current documentation :  Yes  [employer] teaching service handout given to patient:  No         Assessment/Plan     Erythematous rash  Assessment & Plan  Diffuse non pruritic, non blanchable, erythematous rash associated with b/l lower limb edema, anemia, leukopenia and thrombocytopenia. Likely secondary to chemoradiation vs drug reaction with temozolomide.   No superficial or deep vein thrombosis.  -admit to medical for observation with contact precautions.  -no fever, chills or signs of infection but considering high risk of infection will continue antibiotics. Also recommended by the oncology.  -ESR, CRP, BNP pending.    Pancytopenia (HCC)  Assessment & Plan  Anemia, leukopenia or pancytopenia likely secondary to chemoradiation.  -admit to medical with contact precautions  -No fever, chills or signs of infection.  -Believe no indication for blood transfusion or administration of colony growth stimulating factor. To be reassessed later and plan further management.    Glioblastoma (HCC)  Assessment & Plan  On chemoradiation, received recent cycle on 09/05.  -resumed his home medication.      Anticipated Hospital stay: Observation admit        Quality Measures  Quality-Core Measures   Reviewed items::  EKG reviewed, Labs reviewed, Medications reviewed and Radiology images reviewed  Becerra catheter::  No Becerra  DVT prophylaxis pharmacological::  Enoxaparin (Lovenox)    PCP: Allan Jones M.D.

## 2019-09-19 NOTE — DISCHARGE SUMMARY
Internal Medicine Discharge Summary  Note Author: Sandra Benedict M.D.       Name Steve Burnham     1955   Age/Sex 64 y.o. male   MRN [id number]         Admit Date:  2019       Discharge Date:   2018    Service:   [facility] Gray Team  Attending Physician(s):   Dr. Love       Senior Resident(s):   Dr. Benedict  [place] Resident(s):   Dr. Scott  PCP: Allan Jones M.D.      Primary Diagnosis:   Drug eruption rash     Secondary Diagnoses:                Active Problems:    Drug rash POA: Unknown    Pancytopenia (HCC) POA: Unknown    Glioblastoma (HCC) POA: Unknown    Edema POA: Unknown  Resolved Problems:    * No resolved hospital problems. *      Hospital Summary (Brief Narrative):       Mr. Burnham is a very pleasant male with PMHx of Glioblastoma s/p cycle 1 of chemotherpy with Temozolomide ( took for 6 weeks, last dose on ) and radiation ( last treatment 2019) who presented with worsening rash that started around -09/10. Rash is generalized, purpuric, non tender, non pruritic. For last few days, he also noticed bilateral lower extremity edema, no SOB / palpitation / chest pain. In the ER, his vital signs were stable, labs were pertinent for Neutropenia (Abs Neut 1.06) with bandemia, normal PT/ INR / ESR. Bilateral leg USG was negative for DVT. No clear source of infections were seen, history was not suggestive of viral xanthem due to lack of fever / arthralgia / myalgia / GI symptoms. He has no diarrheal illness making HUS less likely and denies any exposures to suggest zoonotic infection. TEN and SJS unlikely given lack of mucosa lesions, characterization of rash and its duration of presence. Initially it was thought that rash might be related to chemotherapy drug side effect, but the timing didn't match. One other possible cause might be Bactrim - which he was taking for PCP prohylaxis while on chemo. He stopped taking Bactrim couple days after the rash  started - the rash seems to be clearing up since. He doesn't recall having any known drug allergy, however, he hasn't taken that many drugs before. Since he is already off Temozolomide and steriods, he doesn't need to be on PCP prophylaxis currently. He can be off of Bactrim now, he with call his outpatient oncology office for a F/u appointment and he will need to be on something else other that Bactrim for PCP prophylaxis during his next cycle.     Patient /Hospital Summary (Details -- Problem Oriented) :          Drug rash  Assessment & Plan  See hospital course    Pancytopenia (HCC)  Assessment & Plan  Anemia, leukopenia or pancytopenia likely secondary to chemoradiation.  No fever, chills or signs of infection.  To be followed by the PCP and patient's oncologist  after discharge .    Edema  Assessment & Plan  Bilateral.  No SOB, on Room air, Lung sounds clear. Likely from drug reaction.   Much improved with 1 dose of Lasix  F/u with PCP for as needed lasix       Glioblastoma (HCC)  Assessment & Plan  On chemoradiation, s/p cycle #1on 09/05   Follow up by the patient's oncologist in [place]   Resumed out patient medications       Consultants:     NA    Procedures:        NA    Imaging/ Testing:      US-EXTREMITY VENOUS LOWER BILAT   Final Result      DX-CHEST-PORTABLE (1 VIEW)   Final Result      No acute cardiac or pulmonary abnormalities are identified.          Discharge Medications:         Medication Reconciliation: Completed       Medication List      CONTINUE taking these medications      Instructions   cloBAZam 10 MG Film   Take 5 mg by mouth 2 times a day. Indications: Lennox-Gastaut Syndrome  Dose:  5 mg     levETIRAcetam 500 MG Tabs  Commonly known as:  KEPPRA   Take 2,000 mg by mouth.  Dose:  2,000 mg     levothyroxine 25 MCG Tabs  Commonly known as:  SYNTHROID   Take 1 Tab by mouth Every morning on an empty stomach.  Dose:  25 mcg     MIRALAX Powd  Generic drug:  polyethylene glycol 3350   Take 17 g  by mouth every day.  Dose:  17 g     therapeutic multivitamin-minerals Tabs   Take 1 Tab by mouth every day.  Dose:  1 Tab            Disposition:  Home    Diet:   Regular as tolerated    Activity:   Normal    Instructions:         The patient was instructed to return to the ER in the event of worsening symptoms. I have counseled the patient on the importance of compliance and the patient has agreed to proceed with all medical recommendations and follow up plan indicated above.   The patient understands that all medications come with benefits and risks. Risks may include permanent injury or death and these risks can be minimized with close reassessment and monitoring.        Primary Care Provider:      Discharge summary faxed to primary care provider:  Deferred  Copy of discharge summary given to the patient: Deferred      Follow up appointment details :      Future Appointments   Date Time Provider Department Center   10/1/2019  8:40 AM Allan Jones M.D. [facility]ANNI Jones M.D.  [address]  [facility] [id number]  [phone]    Schedule an appointment as soon as possible for a visit in 1 week  For CBC blood draw      Pending Studies:        CBC in 1 week    Time spent on discharge day patient visit, preparing discharge paperwork and arranging for patient follow up.    Summary of follow up issues:   Pancytopenia   Monitor lower extremity edema   Monitor rash     Discharge Time (Minutes) :    35 minutes  Hospital Course Type: Inpatient Stay < 2 midnights, patient recovered more rapidly than anticipated      Condition on Discharge  Stable  ______________________________________________________________________    Interval history/exam for day of discharge:    No acute overnight events.   Vitals stable. On RA  Patient not having any acute medical complains. Leg edema much better.   Rash most prominent on legs, body / hands more on clear side.     Physical Exam   Constitutional: He is  oriented to person, place, and time. He appears well-developed and well-nourished. No distress.   Non-toxic appearing male    HENT:   Head: Normocephalic and atraumatic.   Mouth/Throat: Oropharynx is clear and moist.   No oral/mucosal lesions    Eyes: Pupils are equal, round, and reactive to light. EOM are normal. No scleral icterus.   Neck: No JVD present.   Cardiovascular: Normal rate, regular rhythm and normal heart sounds.   No murmur heard.  Pulmonary/Chest: Effort normal and breath sounds normal. No respiratory distress. He has no rales.   Abdominal: Soft. Bowel sounds are normal. He exhibits no distension. There is no tenderness.   Musculoskeletal: Normal range of motion. He exhibits edema 2+.   Lymphadenopathy:     He has no cervical adenopathy.   Neurological: He is alert and oriented to person, place, and time. No cranial nerve deficit.   Mild intermittent slurred speech (chronic from CA)   Skin: Skin is warm. Capillary refill takes less than 2 seconds. Rash noted. There is erythema.   Fading morbilliform rash over anterior chest and forearms, petechial like rash over feet and LE extending up to mid thigh that is non-blanching, no rash on palms or soles of feet. Overall erythematous appearance to back and face without discrete obvious rash   Psychiatric: He has a normal mood and affect.    Most Recent Labs:    Lab Results   Component Value Date/Time    WBC 1.8 (LL) 09/19/2019 04:45 AM    RBC 3.35 (L) 09/19/2019 04:45 AM    HEMOGLOBIN 11.0 (L) 09/19/2019 04:45 AM    HEMATOCRIT 33.3 (L) 09/19/2019 04:45 AM    MCV 99.4 (H) 09/19/2019 04:45 AM    MCH 32.8 09/19/2019 04:45 AM    MCHC 33.0 (L) 09/19/2019 04:45 AM    MPV 9.4 09/19/2019 04:45 AM    NEUTSPOLYS 40.00 (L) 09/19/2019 04:45 AM    LYMPHOCYTES 40.00 09/19/2019 04:45 AM    MONOCYTES 7.00 09/19/2019 04:45 AM    EOSINOPHILS 4.30 09/19/2019 04:45 AM    BASOPHILS 0.00 09/19/2019 04:45 AM    ANISOCYTOSIS 1+ 09/19/2019 04:45 AM      Lab Results   Component  Value Date/Time    SODIUM 143 09/19/2019 04:45 AM    POTASSIUM 3.2 (L) 09/19/2019 04:45 AM    CHLORIDE 108 09/19/2019 04:45 AM    CO2 26 09/19/2019 04:45 AM    GLUCOSE 115 (H) 09/19/2019 04:45 AM    BUN 9 09/19/2019 04:45 AM    CREATININE 0.73 09/19/2019 04:45 AM      Lab Results   Component Value Date/Time    ALTSGPT 54 (H) 09/18/2019 07:53 PM    ASTSGOT 30 09/18/2019 07:53 PM    ALKPHOSPHAT 45 09/18/2019 07:53 PM    TBILIRUBIN 0.6 09/18/2019 07:53 PM    ALBUMIN 3.8 09/18/2019 07:53 PM    GLOBULIN 1.7 (L) 09/18/2019 07:53 PM    INR 0.95 09/18/2019 07:53 PM     Lab Results   Component Value Date/Time    PROTHROMBTM 12.9 09/18/2019 07:53 PM    INR 0.95 09/18/2019 07:53 PM

## 2019-09-19 NOTE — ED NOTES
Lab called with Critical WBS of 2.1 - LARISSA Haskins discussed with Dr. Early.    To get patient back to ER room momentarily.

## 2019-09-19 NOTE — PROGRESS NOTES
Lab called with critical result of WBC 1.8 at 10:07. Critical lab result read back.  [facility] gray team notified of critical lab result at 10:12.  Critical lab result read back by Dr. Scott.

## 2019-09-19 NOTE — ASSESSMENT & PLAN NOTE
Bilateral.  No SOB, on Room air, Lung sounds clear. Likely from drug reaction.   Much improved with 1 dose of Lasix  F/u with PCP for as needed lasix

## 2019-09-19 NOTE — ASSESSMENT & PLAN NOTE
Anemia, leukopenia or pancytopenia likely secondary to chemoradiation.  No fever, chills or signs of infection.  To be followed by the PCP and patient's oncologist  after discharge .

## 2019-09-20 LAB
BACTERIA UR CULT: NORMAL
SIGNIFICANT IND 70042: NORMAL
SITE SITE: NORMAL
SOURCE SOURCE: NORMAL

## 2019-09-23 LAB
BACTERIA BLD CULT: NORMAL
SIGNIFICANT IND 70042: NORMAL
SITE SITE: NORMAL
SOURCE SOURCE: NORMAL

## 2019-09-24 ENCOUNTER — HOSPITAL ENCOUNTER (OUTPATIENT)
Dept: LAB | Facility: MEDICAL CENTER | Age: 64
End: 2019-09-24
Attending: STUDENT IN AN ORGANIZED HEALTH CARE EDUCATION/TRAINING PROGRAM
Payer: COMMERCIAL

## 2019-09-24 DIAGNOSIS — L27.0 DRUG RASH: ICD-10-CM

## 2019-09-24 DIAGNOSIS — D61.818 PANCYTOPENIA (HCC): ICD-10-CM

## 2019-09-24 LAB
ANISOCYTOSIS BLD QL SMEAR: ABNORMAL
BASOPHILS # BLD AUTO: 0 % (ref 0–1.8)
BASOPHILS # BLD: 0 K/UL (ref 0–0.12)
BURR CELLS BLD QL SMEAR: NORMAL
EOSINOPHIL # BLD AUTO: 0.3 K/UL (ref 0–0.51)
EOSINOPHIL NFR BLD: 9.7 % (ref 0–6.9)
ERYTHROCYTE [DISTWIDTH] IN BLOOD BY AUTOMATED COUNT: 65.9 FL (ref 35.9–50)
HCT VFR BLD AUTO: 36 % (ref 42–52)
HGB BLD-MCNC: 11.5 G/DL (ref 14–18)
LYMPHOCYTES # BLD AUTO: 0.73 K/UL (ref 1–4.8)
LYMPHOCYTES NFR BLD: 23.7 % (ref 22–41)
MACROCYTES BLD QL SMEAR: ABNORMAL
MANUAL DIFF BLD: NORMAL
MCH RBC QN AUTO: 32.6 PG (ref 27–33)
MCHC RBC AUTO-ENTMCNC: 31.9 G/DL (ref 33.7–35.3)
MCV RBC AUTO: 102 FL (ref 81.4–97.8)
MICROCYTES BLD QL SMEAR: ABNORMAL
MONOCYTES # BLD AUTO: 0.33 K/UL (ref 0–0.85)
MONOCYTES NFR BLD AUTO: 10.5 % (ref 0–13.4)
MORPHOLOGY BLD-IMP: NORMAL
NEUTROPHILS # BLD AUTO: 1.74 K/UL (ref 1.82–7.42)
NEUTROPHILS NFR BLD: 56.1 % (ref 44–72)
NRBC # BLD AUTO: 0 K/UL
NRBC BLD-RTO: 0 /100 WBC
OVALOCYTES BLD QL SMEAR: NORMAL
PLATELET # BLD AUTO: 132 K/UL (ref 164–446)
PLATELET BLD QL SMEAR: NORMAL
PMV BLD AUTO: 9.7 FL (ref 9–12.9)
POIKILOCYTOSIS BLD QL SMEAR: NORMAL
RBC # BLD AUTO: 3.53 M/UL (ref 4.7–6.1)
RBC BLD AUTO: PRESENT
WBC # BLD AUTO: 3.1 K/UL (ref 4.8–10.8)

## 2019-09-24 PROCEDURE — 85027 COMPLETE CBC AUTOMATED: CPT

## 2019-09-24 PROCEDURE — 36415 COLL VENOUS BLD VENIPUNCTURE: CPT

## 2019-09-24 PROCEDURE — 85007 BL SMEAR W/DIFF WBC COUNT: CPT

## 2019-09-26 LAB
BACTERIA BLD CULT: NORMAL
SIGNIFICANT IND 70042: NORMAL
SITE SITE: NORMAL
SOURCE SOURCE: NORMAL

## 2019-09-27 ENCOUNTER — OFFICE VISIT (OUTPATIENT)
Dept: MEDICAL GROUP | Facility: MEDICAL CENTER | Age: 64
End: 2019-09-27
Payer: COMMERCIAL

## 2019-09-27 VITALS
BODY MASS INDEX: 26.77 KG/M2 | SYSTOLIC BLOOD PRESSURE: 118 MMHG | OXYGEN SATURATION: 96 % | HEART RATE: 89 BPM | WEIGHT: 202 LBS | TEMPERATURE: 97.8 F | DIASTOLIC BLOOD PRESSURE: 70 MMHG | HEIGHT: 73 IN

## 2019-09-27 DIAGNOSIS — L27.0 DRUG RASH: ICD-10-CM

## 2019-09-27 DIAGNOSIS — D61.818 PANCYTOPENIA (HCC): ICD-10-CM

## 2019-09-27 DIAGNOSIS — R60.1 GENERALIZED EDEMA: ICD-10-CM

## 2019-09-27 DIAGNOSIS — C71.9 GLIOBLASTOMA (HCC): ICD-10-CM

## 2019-09-27 DIAGNOSIS — E03.9 HYPOTHYROIDISM, UNSPECIFIED TYPE: ICD-10-CM

## 2019-09-27 PROBLEM — C71.2 MALIGNANT NEOPLASM OF TEMPORAL LOBE (HCC): Status: ACTIVE | Noted: 2019-07-11

## 2019-09-27 PROBLEM — R41.82 ALTERED MENTAL STATUS: Status: ACTIVE | Noted: 2019-07-07

## 2019-09-27 PROBLEM — Z92.84 HISTORY OF UNINTENDED AWARENESS UNDER GENERAL ANESTHESIA: Status: ACTIVE | Noted: 2019-06-27

## 2019-09-27 PROBLEM — I83.90 VARICOSE VEINS OF LOWER EXTREMITY: Status: ACTIVE | Noted: 2019-06-21

## 2019-09-27 PROBLEM — R56.9 SEIZURE (HCC): Status: ACTIVE | Noted: 2019-07-08

## 2019-09-27 PROBLEM — R47.81 SLURRED SPEECH: Status: ACTIVE | Noted: 2019-07-18

## 2019-09-27 PROBLEM — C71.6: Status: ACTIVE | Noted: 2019-06-13

## 2019-09-27 PROBLEM — M25.512 CHRONIC LEFT SHOULDER PAIN: Status: RESOLVED | Noted: 2018-10-29 | Resolved: 2019-09-27

## 2019-09-27 PROBLEM — I77.810 THORACIC AORTIC ECTASIA (HCC): Status: ACTIVE | Noted: 2019-06-21

## 2019-09-27 PROBLEM — R35.1 NOCTURIA: Status: RESOLVED | Noted: 2018-10-29 | Resolved: 2019-09-27

## 2019-09-27 PROBLEM — G89.29 CHRONIC LEFT SHOULDER PAIN: Status: RESOLVED | Noted: 2018-10-29 | Resolved: 2019-09-27

## 2019-09-27 PROCEDURE — 99214 OFFICE O/P EST MOD 30 MIN: CPT | Performed by: INTERNAL MEDICINE

## 2019-09-27 RX ORDER — TEMOZOLOMIDE 140 MG/1
CAPSULE ORAL
COMMUNITY
Start: 2019-08-09

## 2019-09-27 RX ORDER — TEMOZOLOMIDE 20 MG/1
CAPSULE ORAL
COMMUNITY
Start: 2019-07-19

## 2019-09-27 RX ORDER — PANTOPRAZOLE SODIUM 40 MG/1
40 TABLET, DELAYED RELEASE ORAL
COMMUNITY
Start: 2019-08-29 | End: 2019-09-27

## 2019-09-27 RX ORDER — LACOSAMIDE 50 MG
TABLET ORAL
COMMUNITY
Start: 2019-08-15

## 2019-09-27 RX ORDER — POLYETHYLENE GLYCOL 3350 17 G/17G
17 POWDER ORAL
COMMUNITY

## 2019-09-27 RX ORDER — PANTOPRAZOLE SODIUM 40 MG/1
TABLET, DELAYED RELEASE ORAL
COMMUNITY
Start: 2019-08-29

## 2019-09-27 RX ORDER — CLONAZEPAM 0.5 MG/1
0.5 TABLET ORAL
COMMUNITY
Start: 2019-08-21 | End: 2019-09-27

## 2019-09-27 RX ORDER — ONDANSETRON HYDROCHLORIDE 8 MG/1
8 TABLET, FILM COATED ORAL
COMMUNITY
Start: 2019-07-16

## 2019-09-27 RX ORDER — ATORVASTATIN CALCIUM 80 MG/1
40 TABLET, FILM COATED ORAL
COMMUNITY

## 2019-09-27 RX ORDER — HYDROCODONE BITARTRATE AND ACETAMINOPHEN 5; 325 MG/1; MG/1
1-2 TABLET ORAL
COMMUNITY
Start: 2019-06-26

## 2019-09-27 RX ORDER — BISACODYL 5 MG/1
TABLET, DELAYED RELEASE ORAL
COMMUNITY
Start: 2019-07-19

## 2019-09-27 RX ORDER — MULTIVITAMIN
1 CAPSULE ORAL
COMMUNITY

## 2019-09-27 RX ORDER — SILDENAFIL 25 MG/1
TABLET, FILM COATED ORAL
COMMUNITY
Start: 2019-07-11

## 2019-09-27 NOTE — PROGRESS NOTES
Subjective:     Chief Complaint   Patient presents with   • Hospital Follow-up     Steve Burnham is a 64 y.o. male here today for follow-up of recent hospitalization for rash and edema along with pancytopenia and glioblastoma.    Drug rash  This patient was recently noted to have a rash especially on his lower legs that was thought secondary to Bactrim which he was taking for PCP prophylaxis following chemotherapy and radiation for glioblastoma.  He was hospitalized for close observation and further treatment as indicated.  The Bactrim had been stopped.  The rash gradually faded away.  A contributing factor to the rash was petechiae from thrombocytopenia.  The platelet count has been gradually increasing and is now up to 132,000.    Pancytopenia (HCC)  He has been getting radiation therapy as well as chemotherapy for glioblastoma.  He has had some pancytopenia with a white count down to 1.8, more recently 3.1.  Platelet count is up from ,000.  Hemoglobin and hematocrit are up to 11.5 and 36.    Hypothyroidism  He has a history of hypothyroidism for which she is on levothyroxine.  Recent TSH was normal.  Clinically he is euthyroid.    Glioblastoma (HCC)  He has a glioblastoma as noted that has been treated with radiation and chemotherapy and is followed at the [facility].    Edema  He recently has had some edema of the lower extremities that is being treated with low-salt diet and elevation and support stockings.  The edema is gradually resolving.       Diagnoses of Glioblastoma (HCC), Generalized edema, Hypothyroidism, unspecified type, Pancytopenia (HCC), and Drug rash were pertinent to this visit.    Allergies: Patient has no known allergies.  Current medicines (including changes today)  Current Outpatient Medications   Medication Sig Dispense Refill   • cloBAZam 10 MG FILM Take 5 mg by mouth 2 times a day. Indications: Lennox-Gastaut Syndrome     • levETIRAcetam (KEPPRA) 500 MG Tab Take 2,000 mg by  "mouth.     • levothyroxine (SYNTHROID) 25 MCG Tab Take 1 Tab by mouth Every morning on an empty stomach. 90 Tab 3   • therapeutic multivitamin-minerals (THERAGRAN-M) Tab Take 1 Tab by mouth every day.     • polyethylene glycol 3350 (MIRALAX) Powder Take 17 g by mouth every day.       No current facility-administered medications for this visit.        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    ROS    Patient denies significant change in strength, weight or appetite.  No significant lightheadedness or headaches.  No change in vision, hearing, or swallowing.  No new dyspnea, coughing, chest pain, or palpitations.  No indigestion, abdominal pain, or change in bowel habits.  No change in urinating.  No new ankle swelling except the edema noted above.  There was a rash on his lower legs that has practically completely resolved by now.       Objective:     PE:  /70 (BP Location: Left arm, Patient Position: Sitting)   Pulse 89   Temp 36.6 °C (97.8 °F)   Ht 1.854 m (6' 1\")   Wt 91.6 kg (202 lb)   SpO2 96%   BMI 26.65 kg/m²    Neck is supple without significant lymphadenopathy or masses.  Lungs are clear with normal breath sounds without wheezes or rales .  Cardiovascular: peripheral circulation is satisfactory, heart sounds are unchanged and unremarkable.  Abdomen is soft, without masses or tenderness, with normal bowel sounds.  Extremities are without significant edema, cyanosis or deformity.      Assessment and Plan:   The following treatment plan was discussed  1. Glioblastoma (HCC)      Continue regular follow-up at [facility].  He will be returning there late next week.   2. Generalized edema      Continue low-salt diet, elevation of " legs, support stockings.   3. Hypothyroidism, unspecified type      Continue levothyroxine, check TSH at least yearly.   4. Pancytopenia (HCC)      This is gradually resolving currently.  Monitor closely with further chemotherapy.   5. Drug rash      Avoid Bactrim for now.  He is not on chemotherapy so PCP prophylaxis is not necessary at this time.       Followup: Follow-up as needed when he returns to [place].

## 2019-09-27 NOTE — ASSESSMENT & PLAN NOTE
He recently has had some edema of the lower extremities that is being treated with low-salt diet and elevation and support stockings.  The edema is gradually resolving.

## 2019-09-27 NOTE — ASSESSMENT & PLAN NOTE
He has been getting radiation therapy as well as chemotherapy for glioblastoma.  He has had some pancytopenia with a white count down to 1.8, more recently 3.1.  Platelet count is up from ,000.  Hemoglobin and hematocrit are up to 11.5 and 36.

## 2019-09-27 NOTE — ASSESSMENT & PLAN NOTE
He has a glioblastoma as noted that has been treated with radiation and chemotherapy and is followed at the [facility].

## 2019-09-27 NOTE — ASSESSMENT & PLAN NOTE
This patient was recently noted to have a rash especially on his lower legs that was thought secondary to Bactrim which he was taking for PCP prophylaxis following chemotherapy and radiation for glioblastoma.  He was hospitalized for close observation and further treatment as indicated.  The Bactrim had been stopped.  The rash gradually faded away.  A contributing factor to the rash was petechiae from thrombocytopenia.  The platelet count has been gradually increasing and is now up to 132,000.

## 2019-09-27 NOTE — ASSESSMENT & PLAN NOTE
He has a history of hypothyroidism for which she is on levothyroxine.  Recent TSH was normal.  Clinically he is euthyroid.

## 2019-10-01 ENCOUNTER — HOSPITAL ENCOUNTER (OUTPATIENT)
Dept: LAB | Facility: MEDICAL CENTER | Age: 64
End: 2019-10-01
Attending: INTERNAL MEDICINE
Payer: COMMERCIAL

## 2019-10-01 ENCOUNTER — OFFICE VISIT (OUTPATIENT)
Dept: MEDICAL GROUP | Facility: MEDICAL CENTER | Age: 64
End: 2019-10-01
Payer: COMMERCIAL

## 2019-10-01 VITALS
HEART RATE: 105 BPM | OXYGEN SATURATION: 95 % | BODY MASS INDEX: 27.04 KG/M2 | WEIGHT: 204 LBS | SYSTOLIC BLOOD PRESSURE: 120 MMHG | DIASTOLIC BLOOD PRESSURE: 70 MMHG | HEIGHT: 73 IN | TEMPERATURE: 97.2 F

## 2019-10-01 DIAGNOSIS — L27.0 DRUG RASH: ICD-10-CM

## 2019-10-01 DIAGNOSIS — D61.818 PANCYTOPENIA (HCC): ICD-10-CM

## 2019-10-01 LAB
BASOPHILS # BLD AUTO: 1.2 % (ref 0–1.8)
BASOPHILS # BLD: 0.05 K/UL (ref 0–0.12)
EOSINOPHIL # BLD AUTO: 0.31 K/UL (ref 0–0.51)
EOSINOPHIL NFR BLD: 7.5 % (ref 0–6.9)
ERYTHROCYTE [DISTWIDTH] IN BLOOD BY AUTOMATED COUNT: 63.8 FL (ref 35.9–50)
HCT VFR BLD AUTO: 37.4 % (ref 42–52)
HGB BLD-MCNC: 11.9 G/DL (ref 14–18)
IMM GRANULOCYTES # BLD AUTO: 0.04 K/UL (ref 0–0.11)
IMM GRANULOCYTES NFR BLD AUTO: 1 % (ref 0–0.9)
LYMPHOCYTES # BLD AUTO: 1.4 K/UL (ref 1–4.8)
LYMPHOCYTES NFR BLD: 34 % (ref 22–41)
MCH RBC QN AUTO: 32.5 PG (ref 27–33)
MCHC RBC AUTO-ENTMCNC: 31.8 G/DL (ref 33.7–35.3)
MCV RBC AUTO: 102.2 FL (ref 81.4–97.8)
MONOCYTES # BLD AUTO: 1.52 K/UL (ref 0–0.85)
MONOCYTES NFR BLD AUTO: 36.9 % (ref 0–13.4)
NEUTROPHILS # BLD AUTO: 0.8 K/UL (ref 1.82–7.42)
NEUTROPHILS NFR BLD: 19.4 % (ref 44–72)
NRBC # BLD AUTO: 0 K/UL
NRBC BLD-RTO: 0 /100 WBC
PLATELET # BLD AUTO: 211 K/UL (ref 164–446)
PMV BLD AUTO: 9.8 FL (ref 9–12.9)
RBC # BLD AUTO: 3.66 M/UL (ref 4.7–6.1)
WBC # BLD AUTO: 4.1 K/UL (ref 4.8–10.8)

## 2019-10-01 PROCEDURE — 85025 COMPLETE CBC W/AUTO DIFF WBC: CPT

## 2019-10-01 PROCEDURE — 36415 COLL VENOUS BLD VENIPUNCTURE: CPT

## 2019-10-01 PROCEDURE — 99214 OFFICE O/P EST MOD 30 MIN: CPT | Performed by: INTERNAL MEDICINE

## 2019-10-01 NOTE — PROGRESS NOTES
Subjective:     Chief Complaint   Patient presents with   • Rash     Skin rash, x 1day, all over body      Steve Burnham is a 64 y.o. male here today for worsening of his rash.  He is accompanied by his wife.    Drug rash  He comes back in today with worsening of his rash that seemed to start yesterday.  The rash is mildly pruritic.  It is all over his body.  This time he does not have a petechial rash.  This is more macular coalescent.  The only new medication that he has had for the last several days is some Zofran that he took for an upset stomach.  He reports no significant dyspnea or difficulty swallowing.  He has had no significant ankle edema.  The rash is all over his body.  He tried taking Benadryl 25 mg to help him sleep which seemed to work.  He is not sure whether that helped his rash.    Pancytopenia (HCC)  He has a history of pancytopenia related generally to his chemotherapy.  Blood count numbers have been slowly improving.       Diagnoses of Drug rash and Pancytopenia (HCC) were pertinent to this visit.    Allergies: Patient has no known allergies.  Current medicines (including changes today)  Current Outpatient Medications   Medication Sig Dispense Refill   • Acetaminophen 500 MG Cap Take  by mouth.     • atorvastatin (LIPITOR) 80 MG tablet Take 40 mg by mouth.     • Multiple Vitamin (MULTIVITAMIN) capsule Take 1 Cap by mouth.     • ondansetron (ZOFRAN) 8 MG Tab Take 8 mg by mouth.     • pantoprazole (PROTONIX) 40 MG Tablet Delayed Response      • Polyethylene Glycol 3350 (PEG 3350) Powder Take 17 g by mouth.     • cloBAZam 10 MG FILM Take 5 mg by mouth 2 times a day. Indications: Lennox-Gastaut Syndrome     • levETIRAcetam (KEPPRA) 500 MG Tab Take 2,000 mg by mouth.     • levothyroxine (SYNTHROID) 25 MCG Tab Take 1 Tab by mouth Every morning on an empty stomach. 90 Tab 3   • STIMULANT LAXATIVE 5 MG EC tablet      • HYDROcodone-acetaminophen (NORCO) 5-325 MG Tab per tablet Take 1-2 Tabs by  "mouth.     • VIMPAT 50 MG Tab tablet      • sildenafil citrate (VIAGRA) 25 MG Tab      • temozolomide (TEMODAR) 20 MG Cap Take one 140mg cap with one 20mg cap by mouth for a total dose of 160mg for 42 days     • Temozolomide 140 MG Cap        No current facility-administered medications for this visit.        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    ROS    Patient denies significant change in strength, weight or appetite.  No significant lightheadedness or headaches.  No change in vision, hearing, or swallowing.  No new dyspnea, coughing, chest pain, or palpitations.  No indigestion, abdominal pain, or change in bowel habits.  No change in urinating.  No new ankle swelling.  He has the above-noted rash that is now all over his body and is mildly pruritic.       Objective:     PE:  /70 (BP Location: Left arm, Patient Position: Sitting)   Pulse (!) 105   Temp 36.2 °C (97.2 °F)   Ht 1.854 m (6' 1\")   Wt 92.5 kg (204 lb)   SpO2 95%   BMI 26.91 kg/m²    Neck is supple without significant lymphadenopathy or masses.  Lungs are clear with normal breath sounds without wheezes or rales .  Cardiovascular: peripheral circulation is satisfactory, heart sounds are unchanged and unremarkable.  Abdomen is soft, without masses or tenderness, with normal bowel sounds.  Extremities are without significant edema, cyanosis or deformity.  He has a diffuse rash all over his body including his face.  There are no papules or vesicles or pustules.      Assessment and Plan:   The following treatment plan was discussed  1. Drug rash  CBC WITH DIFFERENTIAL    I think this is a drug rash.  We will treat with Benadryl.  Soothing baths.  We discussed potential " side effects.  If he has any trouble breathing or swallowing or peeling off of deep layers of skin, he should go to the emergency room.  We may need to proceed with a short course of steroids but I want him to check with his oncologist before we start that.  Zofran is a potential culprit for this rash.   2. Pancytopenia (HCC)      We will repeat a CBC especially looking at platelets and eosinophils.  We will inform him of the lab results when available.     Patient was seen for 35 minutes face to face of which > 50% of appointment time was spent on counseling and coordination of care regarding the above including counseling with wife.      Followup: He will keep his next scheduled appointment or contact us as needed sooner.  Again, if he notes any problems with dyspnea or difficulty swallowing or peeling of deep layers of skin, he will go to emergency room.

## 2019-10-01 NOTE — ASSESSMENT & PLAN NOTE
He has a history of pancytopenia related generally to his chemotherapy.  Blood count numbers have been slowly improving.

## 2019-10-01 NOTE — ASSESSMENT & PLAN NOTE
He comes back in today with worsening of his rash that seemed to start yesterday.  The rash is mildly pruritic.  It is all over his body.  This time he does not have a petechial rash.  This is more macular coalescent.  The only new medication that he has had for the last several days is some Zofran that he took for an upset stomach.  He reports no significant dyspnea or difficulty swallowing.  He has had no significant ankle edema.  The rash is all over his body.  He tried taking Benadryl 25 mg to help him sleep which seemed to work.  He is not sure whether that helped his rash.

## 2019-10-02 ENCOUNTER — TELEPHONE (OUTPATIENT)
Dept: MEDICAL GROUP | Facility: MEDICAL CENTER | Age: 64
End: 2019-10-02

## 2019-10-02 NOTE — TELEPHONE ENCOUNTER
1. Caller Name: Jaz Burnham                                         Call Back Number: [phone]      Patient approves a detailed voicemail message: yes    Patients wife called and wanted to advise you that the rash is going down and he is taking the benadryl. She also said he started throwing up.

## 2019-10-03 NOTE — TELEPHONE ENCOUNTER
Patient is generally doing better.  His rash is improved.  He has some nausea and vomited but apparently only once.  They contacted the physicians at [place] who agreed with the current plan.

## 2021-02-14 DIAGNOSIS — Z79.899 MEDICATION MANAGEMENT: ICD-10-CM

## 2021-02-16 RX ORDER — LEVOTHYROXINE SODIUM 0.03 MG/1
25 TABLET ORAL
Qty: 90 TABLET | Refills: 3 | Status: SHIPPED | OUTPATIENT
Start: 2021-02-16

## 2022-04-19 NOTE — ASSESSMENT & PLAN NOTE
Chronic constipation and remains stable and unchanged. He uses MiraLAX periodically.   negative - no fever

## 2024-01-23 NOTE — TELEPHONE ENCOUNTER
Received request via: Pharmacy    Was the patient seen in the last year in this department? No     Does the patient have an active prescription (recently filled or refills available) for medication(s) requested? No     Temporary pacemaker removed by Dr. Coronado

## (undated) DEVICE — PACK MINOR BASIN - (2EA/CA)

## (undated) DEVICE — SUTURE 4-0 MONOCRYL PLUS PS-2 - 27 INCH (36/BX)

## (undated) DEVICE — DRAIN PENROSE STERILE 1/4 X - 18 IN  (25EA/BX)

## (undated) DEVICE — BLADE SURGICAL CLIPPER - (50EA/CA)

## (undated) DEVICE — SENSOR SPO2 NEO LNCS ADHESIVE (20/BX) SEE USER NOTES

## (undated) DEVICE — MASK, LARYNGEAL AIRWAY #4

## (undated) DEVICE — SUCTION INSTRUMENT YANKAUER BULBOUS TIP W/O VENT (50EA/CA)

## (undated) DEVICE — GLOVE BIOGEL PI INDICATOR SZ 6.5 SURGICAL PF LF - (50/BX 4BX/CA)

## (undated) DEVICE — ELECTRODE DUAL RETURN W/ CORD - (50/PK)

## (undated) DEVICE — TUBING CLEARLINK DUO-VENT - C-FLO (48EA/CA)

## (undated) DEVICE — SUTURE 3-0 VICRYL PLUS SH - 27 INCH (36/BX)

## (undated) DEVICE — PROTECTOR ULNA NERVE - (36PR/CA)

## (undated) DEVICE — KIT ROOM DECONTAMINATION

## (undated) DEVICE — CHLORAPREP 26 ML APPLICATOR - ORANGE TINT(25/CA)

## (undated) DEVICE — BLADE SURGICAL #15 - (50/BX 3BX/CA)

## (undated) DEVICE — SODIUM CHL IRRIGATION 0.9% 1000ML (12EA/CA)

## (undated) DEVICE — HEAD HOLDER JUNIOR/ADULT

## (undated) DEVICE — SUTURE 3-0 VICRYL PLUS - 12 X 18 INCH (12/BX)

## (undated) DEVICE — SUTURE 0 ETHIBOND MO6 C/R - (12/BX) 8-18 INCH ETHICON

## (undated) DEVICE — GLOVE BIOGEL SZ 7.5 SURGICAL PF LTX - (50PR/BX 4BX/CA)

## (undated) DEVICE — SET EXTENSION WITH 2 PORTS (48EA/CA) ***PART #2C8610 IS A SUBSTITUTE*****

## (undated) DEVICE — GLOVE SZ 6 BIOGEL PI MICRO - PF LF (50PR/BX 4BX/CA)

## (undated) DEVICE — DERMABOND ADVANCED - (12EA/BX)

## (undated) DEVICE — NEPTUNE 4 PORT MANIFOLD - (20/PK)

## (undated) DEVICE — DETERGENT RENUZYME PLUS 10 OZ PACKET (50/BX)

## (undated) DEVICE — SET LEADWIRE 5 LEAD BEDSIDE DISPOSABLE ECG (1SET OF 5/EA)

## (undated) DEVICE — SUTURE 3-0 VICRYL PLUS SH - 8X 18 INCH (12/BX)

## (undated) DEVICE — MASK ANESTHESIA ADULT  - (100/CA)

## (undated) DEVICE — ELECTRODE 850 FOAM ADHESIVE - HYDROGEL RADIOTRNSPRNT (50/PK)

## (undated) DEVICE — SUTURE GENERAL

## (undated) DEVICE — KIT ANESTHESIA W/CIRCUIT & 3/LT BAG W/FILTER (20EA/CA)

## (undated) DEVICE — LACTATED RINGERS INJ 1000 ML - (14EA/CA 60CA/PF)

## (undated) DEVICE — CANISTER SUCTION 3000ML MECHANICAL FILTER AUTO SHUTOFF MEDI-VAC NONSTERILE LF DISP  (40EA/CA)

## (undated) DEVICE — DRAPE LAPAROTOMY T SHEET - (12EA/CA)